# Patient Record
Sex: FEMALE | Race: WHITE | Employment: OTHER | URBAN - METROPOLITAN AREA
[De-identification: names, ages, dates, MRNs, and addresses within clinical notes are randomized per-mention and may not be internally consistent; named-entity substitution may affect disease eponyms.]

---

## 2021-08-06 ENCOUNTER — HOSPITAL ENCOUNTER (INPATIENT)
Age: 71
LOS: 5 days | Discharge: HOME HEALTH | DRG: 917 | End: 2021-08-11
Attending: EMERGENCY MEDICINE | Admitting: INTERNAL MEDICINE
Payer: MEDICARE

## 2021-08-06 DIAGNOSIS — G30.0 EARLY ONSET ALZHEIMER'S DEMENTIA WITH BEHAVIORAL DISTURBANCE (HCC): ICD-10-CM

## 2021-08-06 DIAGNOSIS — I95.2 HYPOTENSION DUE TO DRUGS: ICD-10-CM

## 2021-08-06 DIAGNOSIS — R09.02 HYPOXIA: ICD-10-CM

## 2021-08-06 DIAGNOSIS — D64.9 ANEMIA, UNSPECIFIED TYPE: ICD-10-CM

## 2021-08-06 DIAGNOSIS — T50.901D ACCIDENTAL DRUG OVERDOSE, SUBSEQUENT ENCOUNTER: ICD-10-CM

## 2021-08-06 DIAGNOSIS — F02.818 EARLY ONSET ALZHEIMER'S DEMENTIA WITH BEHAVIORAL DISTURBANCE (HCC): ICD-10-CM

## 2021-08-06 DIAGNOSIS — T50.901A ACCIDENTAL OVERDOSE, INITIAL ENCOUNTER: Primary | ICD-10-CM

## 2021-08-06 LAB
ALBUMIN SERPL-MCNC: 3.2 G/DL (ref 3.2–4.6)
ALBUMIN/GLOB SERPL: 0.8 {RATIO} (ref 1.2–3.5)
ALP SERPL-CCNC: 90 U/L (ref 50–136)
ALT SERPL-CCNC: 17 U/L (ref 12–65)
AMPHET UR QL SCN: NEGATIVE
ANION GAP SERPL CALC-SCNC: 3 MMOL/L (ref 7–16)
APAP SERPL-MCNC: <10 UG/ML (ref 10–30)
APPEARANCE UR: CLEAR
APTT PPP: 26.7 SEC (ref 24.1–35.1)
AST SERPL-CCNC: 9 U/L (ref 15–37)
ATRIAL RATE: 128 BPM
BACTERIA URNS QL MICRO: ABNORMAL /HPF
BARBITURATES UR QL SCN: NEGATIVE
BASOPHILS # BLD: 0 K/UL (ref 0–0.2)
BASOPHILS NFR BLD: 0 % (ref 0–2)
BENZODIAZ UR QL: POSITIVE
BILIRUB SERPL-MCNC: 0.4 MG/DL (ref 0.2–1.1)
BILIRUB UR QL: NEGATIVE
BUN SERPL-MCNC: 15 MG/DL (ref 8–23)
CALCIUM SERPL-MCNC: 9.2 MG/DL (ref 8.3–10.4)
CALCULATED P AXIS, ECG09: 91 DEGREES
CALCULATED R AXIS, ECG10: -50 DEGREES
CALCULATED T AXIS, ECG11: 82 DEGREES
CANNABINOIDS UR QL SCN: NEGATIVE
CASTS URNS QL MICRO: ABNORMAL /LPF
CHLORIDE SERPL-SCNC: 107 MMOL/L (ref 98–107)
CO2 SERPL-SCNC: 32 MMOL/L (ref 21–32)
COCAINE UR QL SCN: NEGATIVE
COLOR UR: YELLOW
CREAT SERPL-MCNC: 0.71 MG/DL (ref 0.6–1)
DIAGNOSIS, 93000: NORMAL
DIFFERENTIAL METHOD BLD: ABNORMAL
EOSINOPHIL # BLD: 0.1 K/UL (ref 0–0.8)
EOSINOPHIL NFR BLD: 1 % (ref 0.5–7.8)
EPI CELLS #/AREA URNS HPF: ABNORMAL /HPF
ERYTHROCYTE [DISTWIDTH] IN BLOOD BY AUTOMATED COUNT: 13.5 % (ref 11.9–14.6)
ETHANOL SERPL-MCNC: <3 MG/DL
GLOBULIN SER CALC-MCNC: 3.8 G/DL (ref 2.3–3.5)
GLUCOSE SERPL-MCNC: 122 MG/DL (ref 65–100)
GLUCOSE UR STRIP.AUTO-MCNC: NEGATIVE MG/DL
HCT VFR BLD AUTO: 37.4 % (ref 35.8–46.3)
HGB BLD-MCNC: 11.9 G/DL (ref 11.7–15.4)
HGB UR QL STRIP: ABNORMAL
IMM GRANULOCYTES # BLD AUTO: 0 K/UL (ref 0–0.5)
IMM GRANULOCYTES NFR BLD AUTO: 0 % (ref 0–5)
INR PPP: 1.2
KETONES UR QL STRIP.AUTO: NEGATIVE MG/DL
LEUKOCYTE ESTERASE UR QL STRIP.AUTO: ABNORMAL
LYMPHOCYTES # BLD: 2.9 K/UL (ref 0.5–4.6)
LYMPHOCYTES NFR BLD: 28 % (ref 13–44)
MAGNESIUM SERPL-MCNC: 1.9 MG/DL (ref 1.8–2.4)
MCH RBC QN AUTO: 28.3 PG (ref 26.1–32.9)
MCHC RBC AUTO-ENTMCNC: 31.8 G/DL (ref 31.4–35)
MCV RBC AUTO: 88.8 FL (ref 79.6–97.8)
METHADONE UR QL: NEGATIVE
MONOCYTES # BLD: 0.7 K/UL (ref 0.1–1.3)
MONOCYTES NFR BLD: 7 % (ref 4–12)
NEUTS SEG # BLD: 6.4 K/UL (ref 1.7–8.2)
NEUTS SEG NFR BLD: 63 % (ref 43–78)
NITRITE UR QL STRIP.AUTO: POSITIVE
NRBC # BLD: 0 K/UL (ref 0–0.2)
OPIATES UR QL: NEGATIVE
P-R INTERVAL, ECG05: 130 MS
PCP UR QL: NEGATIVE
PH UR STRIP: 6 [PH] (ref 5–9)
PLATELET # BLD AUTO: 399 K/UL (ref 150–450)
PMV BLD AUTO: 8.4 FL (ref 9.4–12.3)
POTASSIUM SERPL-SCNC: 3.4 MMOL/L (ref 3.5–5.1)
PROT SERPL-MCNC: 7 G/DL (ref 6.3–8.2)
PROT UR STRIP-MCNC: NEGATIVE MG/DL
PROTHROMBIN TIME: 15.3 SEC (ref 12.5–14.7)
Q-T INTERVAL, ECG07: 300 MS
QRS DURATION, ECG06: 68 MS
QTC CALCULATION (BEZET), ECG08: 438 MS
RBC # BLD AUTO: 4.21 M/UL (ref 4.05–5.2)
RBC #/AREA URNS HPF: ABNORMAL /HPF
SALICYLATES SERPL-MCNC: <1.7 MG/DL (ref 2.8–20)
SODIUM SERPL-SCNC: 142 MMOL/L (ref 136–145)
SP GR UR REFRACTOMETRY: 1.01 (ref 1–1.02)
UROBILINOGEN UR QL STRIP.AUTO: 0.2 EU/DL (ref 0.2–1)
VENTRICULAR RATE, ECG03: 128 BPM
WBC # BLD AUTO: 10.3 K/UL (ref 4.3–11.1)
WBC URNS QL MICRO: ABNORMAL /HPF

## 2021-08-06 PROCEDURE — 85025 COMPLETE CBC W/AUTO DIFF WBC: CPT

## 2021-08-06 PROCEDURE — 81001 URINALYSIS AUTO W/SCOPE: CPT

## 2021-08-06 PROCEDURE — 85610 PROTHROMBIN TIME: CPT

## 2021-08-06 PROCEDURE — 96360 HYDRATION IV INFUSION INIT: CPT

## 2021-08-06 PROCEDURE — 74011000258 HC RX REV CODE- 258: Performed by: EMERGENCY MEDICINE

## 2021-08-06 PROCEDURE — 81003 URINALYSIS AUTO W/O SCOPE: CPT

## 2021-08-06 PROCEDURE — 83735 ASSAY OF MAGNESIUM: CPT

## 2021-08-06 PROCEDURE — 99285 EMERGENCY DEPT VISIT HI MDM: CPT

## 2021-08-06 PROCEDURE — 74011250636 HC RX REV CODE- 250/636: Performed by: INTERNAL MEDICINE

## 2021-08-06 PROCEDURE — 80307 DRUG TEST PRSMV CHEM ANLYZR: CPT

## 2021-08-06 PROCEDURE — 74011000250 HC RX REV CODE- 250: Performed by: EMERGENCY MEDICINE

## 2021-08-06 PROCEDURE — 85730 THROMBOPLASTIN TIME PARTIAL: CPT

## 2021-08-06 PROCEDURE — 80143 DRUG ASSAY ACETAMINOPHEN: CPT

## 2021-08-06 PROCEDURE — 51702 INSERT TEMP BLADDER CATH: CPT

## 2021-08-06 PROCEDURE — 80179 DRUG ASSAY SALICYLATE: CPT

## 2021-08-06 PROCEDURE — 96374 THER/PROPH/DIAG INJ IV PUSH: CPT

## 2021-08-06 PROCEDURE — 06HY33Z INSERTION OF INFUSION DEVICE INTO LOWER VEIN, PERCUTANEOUS APPROACH: ICD-10-PCS | Performed by: EMERGENCY MEDICINE

## 2021-08-06 PROCEDURE — 93005 ELECTROCARDIOGRAM TRACING: CPT | Performed by: EMERGENCY MEDICINE

## 2021-08-06 PROCEDURE — 82077 ASSAY SPEC XCP UR&BREATH IA: CPT

## 2021-08-06 PROCEDURE — 99223 1ST HOSP IP/OBS HIGH 75: CPT | Performed by: INTERNAL MEDICINE

## 2021-08-06 PROCEDURE — 80053 COMPREHEN METABOLIC PANEL: CPT

## 2021-08-06 PROCEDURE — 75810000455 HC PLCMT CENT VENOUS CATH LVL 2 5182

## 2021-08-06 PROCEDURE — 74011250636 HC RX REV CODE- 250/636: Performed by: EMERGENCY MEDICINE

## 2021-08-06 PROCEDURE — 65610000001 HC ROOM ICU GENERAL

## 2021-08-06 RX ORDER — NOREPINEPHRINE BITARTRATE/D5W 8 MG/250ML
0-16 PLASTIC BAG, INJECTION (ML) INTRAVENOUS
Status: DISCONTINUED | OUTPATIENT
Start: 2021-08-06 | End: 2021-08-06 | Stop reason: SDUPTHER

## 2021-08-06 RX ORDER — ONDANSETRON 2 MG/ML
4 INJECTION INTRAMUSCULAR; INTRAVENOUS
Status: DISCONTINUED | OUTPATIENT
Start: 2021-08-06 | End: 2021-08-11 | Stop reason: HOSPADM

## 2021-08-06 RX ORDER — ACETAMINOPHEN 325 MG/1
650 TABLET ORAL
Status: DISCONTINUED | OUTPATIENT
Start: 2021-08-06 | End: 2021-08-11 | Stop reason: HOSPADM

## 2021-08-06 RX ORDER — POLYETHYLENE GLYCOL 3350 17 G/17G
17 POWDER, FOR SOLUTION ORAL DAILY PRN
Status: DISCONTINUED | OUTPATIENT
Start: 2021-08-06 | End: 2021-08-11 | Stop reason: HOSPADM

## 2021-08-06 RX ORDER — SODIUM CHLORIDE 0.9 % (FLUSH) 0.9 %
5-40 SYRINGE (ML) INJECTION AS NEEDED
Status: DISCONTINUED | OUTPATIENT
Start: 2021-08-06 | End: 2021-08-11 | Stop reason: HOSPADM

## 2021-08-06 RX ORDER — ONDANSETRON 4 MG/1
4 TABLET, ORALLY DISINTEGRATING ORAL
Status: DISCONTINUED | OUTPATIENT
Start: 2021-08-06 | End: 2021-08-11 | Stop reason: HOSPADM

## 2021-08-06 RX ORDER — SODIUM CHLORIDE 0.9 % (FLUSH) 0.9 %
5-40 SYRINGE (ML) INJECTION EVERY 8 HOURS
Status: DISCONTINUED | OUTPATIENT
Start: 2021-08-06 | End: 2021-08-11 | Stop reason: HOSPADM

## 2021-08-06 RX ORDER — NOREPINEPHRINE BITARTRATE/D5W 4MG/250ML
.5-3 PLASTIC BAG, INJECTION (ML) INTRAVENOUS
Status: DISCONTINUED | OUTPATIENT
Start: 2021-08-06 | End: 2021-08-07

## 2021-08-06 RX ORDER — ACETAMINOPHEN 650 MG/1
650 SUPPOSITORY RECTAL
Status: DISCONTINUED | OUTPATIENT
Start: 2021-08-06 | End: 2021-08-11 | Stop reason: HOSPADM

## 2021-08-06 RX ORDER — SODIUM CHLORIDE 9 MG/ML
125 INJECTION, SOLUTION INTRAVENOUS CONTINUOUS
Status: DISCONTINUED | OUTPATIENT
Start: 2021-08-06 | End: 2021-08-07

## 2021-08-06 RX ADMIN — NOREPINEPHRINE BITARTRATE 4 MCG/MIN: 1 INJECTION, SOLUTION, CONCENTRATE INTRAVENOUS at 16:15

## 2021-08-06 RX ADMIN — Medication 10 ML: at 18:34

## 2021-08-06 RX ADMIN — SODIUM CHLORIDE 125 ML/HR: 900 INJECTION, SOLUTION INTRAVENOUS at 17:55

## 2021-08-06 RX ADMIN — SODIUM CHLORIDE 1000 ML: 900 INJECTION, SOLUTION INTRAVENOUS at 14:32

## 2021-08-06 RX ADMIN — SODIUM CHLORIDE 1000 ML: 900 INJECTION, SOLUTION INTRAVENOUS at 14:50

## 2021-08-06 RX ADMIN — SODIUM CHLORIDE 1000 ML: 900 INJECTION, SOLUTION INTRAVENOUS at 16:06

## 2021-08-06 NOTE — ED PROVIDER NOTES
Aleshia Hoewll      Candace Jose is a 78 y.o. female seen on 8/6/2021 in the [facility] EMERGENCY DEPT in room ER07/07. History, review of systems and physical exam limited secondary to patient's significant dementia. Chief Complaint   Patient presents with    Drug Overdose     HPI: 30-year-old female with history of early onset advanced dementia presented to the emergency department via POV after accidentally ingesting a large amount of patient's  medications. Approximately 2 hours ago patient took 40-60 100mg Cilostazol. This is patient's  medication for intermittent claudication. Patient family walked in on patient taking the pills and try to get them out of her mouth however she threw them up and swallowed them all. Patient presented to the emergency department with significant hypotension at 50/30 and significantly tachycardic. Patient with significant dementia and is unable to answer why she took the medications. She  does not remember taking the medications. She states that she would like to go home. She has no other complaints at this time. Historian:     REVIEW OF SYSTEMS     Review of Systems   Unable to perform ROS: Dementia   All other systems reviewed and are negative.       PAST MEDICAL HISTORY     Past Medical History:   Diagnosis Date    Anxiety     Depression      Past Surgical History:   Procedure Laterality Date    HX APPENDECTOMY       Social History     Socioeconomic History    Marital status:      Spouse name: Not on file    Number of children: Not on file    Years of education: Not on file    Highest education level: Not on file   Tobacco Use    Smoking status: Current Every Day Smoker     Packs/day: 1.00     Years: 45.00     Pack years: 45.00     Types: Cigarettes    Smokeless tobacco: Never Used     Social Determinants of Health     Financial Resource Strain:     Difficulty of Paying Living Expenses:    Food Insecurity:     Worried About Running Out of Food in the Last Year:     [address] in the Last Year:    Transportation Needs:     Lack of Transportation (Medical):  Lack of Transportation (Non-Medical):    Physical Activity:     Days of Exercise per Week:     Minutes of Exercise per Session:    Stress:     Feeling of Stress :    Social Connections:     Frequency of Communication with Friends and Family:     Frequency of Social Gatherings with Friends and Family:     Attends Gnosticism Services:     Active Member of Clubs or Organizations:     Attends Club or Organization Meetings:     Marital Status:      Cannot display prior to admission medications because the patient has not been admitted in this contact. No Known Allergies     PHYSICAL EXAM       Vitals:    08/06/21 1514 08/06/21 1558 08/06/21 1609 08/06/21 1614   BP: (!) 88/58 (!) 86/64 (!) 90/59 (!) 81/47   Pulse:   (!) 129 (!) 124   Resp:    28   Temp:       SpO2:   91% 92%    Vital signs were reviewed. Physical Exam  Vitals and nursing note reviewed. Constitutional:       General: She is not in acute distress. Appearance: Normal appearance. She is not ill-appearing or toxic-appearing. HENT:      Head: Normocephalic and atraumatic. Mouth/Throat:      Mouth: Mucous membranes are dry. Eyes:      Extraocular Movements: Extraocular movements intact. Pupils: Pupils are equal, round, and reactive to light. Cardiovascular:      Rate and Rhythm: Regular rhythm. Tachycardia present. Pulses: Normal pulses. Heart sounds: Normal heart sounds. Pulmonary:      Effort: Pulmonary effort is normal.      Breath sounds: Normal breath sounds. Abdominal:      General: There is no distension. Palpations: Abdomen is soft. Musculoskeletal:         General: Normal range of motion. Cervical back: Normal range of motion. Skin:     General: Skin is warm and dry.    Neurological:      Mental Status: She is alert. Mental status is at baseline. She is disoriented. Comments: To person only   Psychiatric:      Comments: Normal affect. Significant dementia. MEDICAL DECISION MAKING     ED Course:    Orders Placed This Encounter    CENTRAL VENOUS LINE INSERTION    CBC WITH AUTOMATED DIFF    CMP    SALICYLATE    ACETAMINOPHEN    ALCOHOL    MAGNESIUM    URINE DRUG SCREEN    URINALYSIS    NURSING-MISCELLANEOUS: Complete - [place] - Suicide Severity Rating Scale CONTINUOUS    NURSING-MISCELLANEOUS: Complete - SBIRT: Screening, Brief Intervention, and Referral to Treatment for Substance Use. CONTINUOUS    POC URINE DIPSTICK    CRITICAL CARE (ASAP ONLY)    EKG    sodium chloride 0.9 % bolus infusion 1,000 mL    sodium chloride 0.9 % bolus infusion 1,000 mL    sodium chloride 0.9 % bolus infusion 1,000 mL    DISCONTD: NOREPINephrine (LEVOPHED) 8,000 mcg in dextrose 5% 250 mL infusion    DISCONTD: NOREPINephrine (LEVOPHED) 4,000 mcg in dextrose 5% 250 mL infusion    NOREPINephrine (LEVOPHED) 4 mg in 5% dextrose 250 mL infusion     Recent Results (from the past 8 hour(s))   EKG, 12 LEAD, INITIAL    Collection Time: 08/06/21  2:28 PM   Result Value Ref Range    Ventricular Rate 128 BPM    Atrial Rate 128 BPM    P-R Interval 130 ms    QRS Duration 68 ms    Q-T Interval 300 ms    QTC Calculation (Bezet) 438 ms    Calculated P Axis 91 degrees    Calculated R Axis -50 degrees    Calculated T Axis 82 degrees    Diagnosis       !! AGE AND GENDER SPECIFIC ECG ANALYSIS !!   Sinus tachycardia  Right atrial enlargement  Pulmonary disease pattern  Left anterior fascicular block  Abnormal ECG  No previous ECGs available     CBC WITH AUTOMATED DIFF    Collection Time: 08/06/21  2:31 PM   Result Value Ref Range    WBC 10.3 4.3 - 11.1 K/uL    RBC 4.21 4.05 - 5.2 M/uL    HGB 11.9 11.7 - 15.4 g/dL    HCT 37.4 35.8 - 46.3 %    MCV 88.8 79.6 - 97.8 FL    MCH 28.3 26.1 - 32.9 PG    MCHC 31.8 31.4 - 35.0 g/dL    RDW 13.5 11.9 - 14.6 %    PLATELET 814 693 - 805 K/uL    MPV 8.4 (L) 9.4 - 12.3 FL    ABSOLUTE NRBC 0.00 0.0 - 0.2 K/uL    DF AUTOMATED      NEUTROPHILS 63 43 - 78 %    LYMPHOCYTES 28 13 - 44 %    MONOCYTES 7 4.0 - 12.0 %    EOSINOPHILS 1 0.5 - 7.8 %    BASOPHILS 0 0.0 - 2.0 %    IMMATURE GRANULOCYTES 0 0.0 - 5.0 %    ABS. NEUTROPHILS 6.4 1.7 - 8.2 K/UL    ABS. LYMPHOCYTES 2.9 0.5 - 4.6 K/UL    ABS. MONOCYTES 0.7 0.1 - 1.3 K/UL    ABS. EOSINOPHILS 0.1 0.0 - 0.8 K/UL    ABS. BASOPHILS 0.0 0.0 - 0.2 K/UL    ABS. IMM. GRANS. 0.0 0.0 - 0.5 K/UL   METABOLIC PANEL, COMPREHENSIVE    Collection Time: 08/06/21  2:31 PM   Result Value Ref Range    Sodium 142 136 - 145 mmol/L    Potassium 3.4 (L) 3.5 - 5.1 mmol/L    Chloride 107 98 - 107 mmol/L    CO2 32 21 - 32 mmol/L    Anion gap 3 (L) 7 - 16 mmol/L    Glucose 122 (H) 65 - 100 mg/dL    BUN 15 8 - 23 MG/DL    Creatinine 0.71 0.6 - 1.0 MG/DL    GFR est AA >60 >60 ml/min/1.73m2    GFR est non-AA >60 >60 ml/min/1.73m2    Calcium 9.2 8.3 - 10.4 MG/DL    Bilirubin, total 0.4 0.2 - 1.1 MG/DL    ALT (SGPT) 17 12 - 65 U/L    AST (SGOT) 9 (L) 15 - 37 U/L    Alk. phosphatase 90 50 - 136 U/L    Protein, total 7.0 6.3 - 8.2 g/dL    Albumin 3.2 3.2 - 4.6 g/dL    Globulin 3.8 (H) 2.3 - 3.5 g/dL    A-G Ratio 0.8 (L) 1.2 - 3.5     SALICYLATE    Collection Time: 08/06/21  2:31 PM   Result Value Ref Range    Salicylate level <1.0 (L) 2.8 - 20.0 MG/DL   ETHYL ALCOHOL    Collection Time: 08/06/21  2:31 PM   Result Value Ref Range    ALCOHOL(ETHYL),SERUM <3 MG/DL   MAGNESIUM    Collection Time: 08/06/21  2:31 PM   Result Value Ref Range    Magnesium 1.9 1.8 - 2.4 mg/dL     No results found. EKG interpretation #1: Rate 133. Sinus tachycardia with normal MA and QT intervals. ED Course as of Aug 06 1623   Fri Aug 06, 2021   1446 Discussed with poison control. There is no definitive treatment for this patient's overdose.   Per poison control's literature search, the other known high dose overdose on this medication resulted in refractory hypotension despite pressors, cardiac dysrhythmias and ultimately ended up in patient's death 13 hours after ingestion. Blood control recommended aggressive fluid hydration and pressors as needed. No other interventions indicated at this time. [JL]   4092 Explained at length to patient's  was agreeable this plan understands the critical nature of patient's condition. He consented for central line placement. [JL]   [phone] Discussed with intensivist.  Patient will be admitted to the intensivist.    [JL]      ED Course User Index  [JL] Eber Yepez, DO         MDM  Number of Diagnoses or Management Options  Accidental overdose, initial encounter  Hypotension due to drugs  Diagnosis management comments: 82-year-old female presented to the emergency department after an overdose on her 's medications. Discussed with poison control who recommended symptomatic care however there is no reversal agent or definitive treatment for patient's overdose. Per poison control, there is one reported case of similar overdose that resulted in refractory hypotension, cardiac dysrhythmia and ultimately expiration of patient 13 hours after ingestion. Patient intermittently responsive to fluids in the emergency department. Central line was placed. Discussed with intensivist as well and patient will be admitted to the intensive care unit. Levophed ordered as well with continued fluid hydration. I discussed at length with patient's  who is aware critical nature of patient's condition. Patient will be admitted to the intensivist for further treatment evaluation.        Amount and/or Complexity of Data Reviewed  Clinical lab tests: ordered  Decide to obtain previous medical records or to obtain history from someone other than the patient: yes  Obtain history from someone other than the patient: yes  Review and summarize past medical records: yes  Discuss the patient with other providers: yes    Patient Progress  Patient progress: (Critical)    Central Line    Date/Time: 8/6/2021 4:21 PM  Performed by: Betzy Roger DO  Authorized by: Betzy Roger DO     Consent:     Consent obtained:  Written    Consent given by:  Spouse    Risks discussed:  Bleeding and infection  Pre-procedure details:     Hand hygiene: Hand hygiene performed prior to insertion      Skin preparation:  2% chlorhexidine    Skin preparation agent: Skin preparation agent completely dried prior to procedure    Procedure details:     Location:  L femoral    Patient position:  Trendelenburg    Procedural supplies: Quad lumen. Landmarks identified: yes      Ultrasound guidance: yes      Sterile ultrasound techniques: Sterile gel and sterile probe covers were used      Number of attempts:  1    Successful placement: yes    Post-procedure details:     Post-procedure:  Dressing applied and line sutured    Assessment:  Blood return through all ports and free fluid flow    Patient tolerance of procedure:   Tolerated well, no immediate complications  Critical Care  Performed by: Betzy Roger DO  Authorized by: Betzy Roger DO     Critical care provider statement:     Critical care time (minutes):  120    Critical care was necessary to treat or prevent imminent or life-threatening deterioration of the following conditions:  Shock and toxidrome    Critical care was time spent personally by me on the following activities:  Blood draw for specimens, development of treatment plan with patient or surrogate, discussions with consultants, evaluation of patient's response to treatment, examination of patient, obtaining history from patient or surrogate, ordering and performing treatments and interventions, ordering and review of laboratory studies, pulse oximetry and re-evaluation of patient's condition  Comments:      Potentially lethal overdose requiring multiple conversations with poison control and intensivist.  Aggressive fluid hydration with pressor support. Disposition: Admitted   Diagnosis:     ICD-10-CM ICD-9-CM   1. Accidental overdose, initial encounter  T50.901A 977.9     E858.9   2. Hypotension due to drugs  I95.2 458.8     E947.9     ____________________________________________________________________  A portion of this note was generated using voice recognition dictation software. While the note has been reviewed for accuracy, please note certain words and phrases may not be transcribed as intended and some grammatical and/or typographical errors may be present.

## 2021-08-06 NOTE — PROGRESS NOTES
Bedside and Verbal shift change report given to Marcus RN (oncoming nurse) by Marcello Ray RN (offgoing nurse). Report included the following information SBAR, Kardex and ED Summary.

## 2021-08-06 NOTE — PROGRESS NOTES
Patient received to ICU. Patient connected to bedside monitor. Sinus tach 130s on monitor. BP stable on 4 mcg/min of levophed. Decreased to 2 mcg/min. Other VSS. Patient oriented to self only. This is baseline per . Soft restraints and mitts in place for patient safety to prevent pulling of CVC. Skin intact. Will continue to monitor until shift change.

## 2021-08-06 NOTE — ED NOTES
TRANSFER - OUT REPORT:    Verbal report given to [facility] RN(name) on Darren Hart  being transferred to ICU(unit) for routine progression of care       Report consisted of patients Situation, Background, Assessment and   Recommendations(SBAR). Information from the following report(s) SBAR was reviewed with the receiving nurse. Lines:   Quad Lumen 08/06/21 Left;Proximal Femoral (Active)       Peripheral IV 08/06/21 Right Antecubital (Active)       Peripheral IV 08/06/21 Left Forearm (Active)   Site Assessment Clean, dry, & intact 08/06/21 1443   Phlebitis Assessment 0 08/06/21 1443   Infiltration Assessment 0 08/06/21 1443   Dressing Status Clean, dry, & intact 08/06/21 1443   Hub Color/Line Status Pink 08/06/21 1443        Opportunity for questions and clarification was provided.       Patient transported with:   Monitor  Registered Nurse

## 2021-08-06 NOTE — PROGRESS NOTES
TRANSFER - IN REPORT:    Verbal report received from Marissa(name) on Ruddy Maher  being received from ED(unit) for routine progression of care      Report consisted of patients Situation, Background, Assessment and   Recommendations(SBAR). Information from the following report(s) SBAR, ED Summary, Intake/Output, MAR, Recent Results and Cardiac Rhythm S. Tach was reviewed with the receiving nurse. Opportunity for questions and clarification was provided. Assessment completed upon patients arrival to unit and care assumed.

## 2021-08-06 NOTE — H&P
History and Physical/Consult  Marcella Schroeder    8/6/2021    Date of Admission:  8/6/2021      Subjective:     Patient is a 78 y.o.  female presents with cilostazol OD. A pleasantly demented, minimally functional and fully dependent female presents after having been discovered to have swallowed around 60 tablets of cilostazol. Brought to ER and she is being admitted for observation to ICU. Cilostazol OD is a rare occurrence and associated with vasodilation induced hypotension cardiac arrhythmias and bleeding. There is no antidote and it is non dialyzable and care is supportive. Patient had no suicidal intention, she simply did not know what she was doing due to her advanced dementia and was left alone as her  says for around 5 min. She is stable, cannot follow command and is awake and alert at present with some hypotention requiring levophed. A CVC was placed in her left femoral vein with difficulty due to patient lack of cooperation from her dementia. Prior to Admission Medications   Prescriptions Last Dose Informant Patient Reported? Taking?   escitalopram oxalate (LEXAPRO) 10 mg tablet   Yes No   Sig: Take 10 mg by mouth daily. umeclidinium-vilanterol (ANORO ELLIPTA) 62.5-25 mcg/actuation inhaler   No No   Sig: Take 1 Puff by inhalation daily.       Facility-Administered Medications: None       Review of Systems  Unable to obtain due to dementia          Past Medical History:   Diagnosis Date    Anxiety     Depression      Past Surgical History:   Procedure Laterality Date    HX APPENDECTOMY       Social History     Socioeconomic History    Marital status:      Spouse name: Not on file    Number of children: Not on file    Years of education: Not on file    Highest education level: Not on file   Occupational History    Not on file   Tobacco Use    Smoking status: Current Every Day Smoker     Packs/day: 1.00     Years: 45.00     Pack years: 45.00     Types: Cigarettes    Smokeless tobacco: Never Used   Substance and Sexual Activity    Alcohol use: Not on file    Drug use: Not on file    Sexual activity: Not on file   Other Topics Concern    Not on file   Social History Narrative    Not on file     Social Determinants of Health     Financial Resource Strain:     Difficulty of Paying Living Expenses:    Food Insecurity:     Worried About Running Out of Food in the Last Year:     [address] in the Last Year:    Transportation Needs:     Lack of Transportation (Medical):  Lack of Transportation (Non-Medical):    Physical Activity:     Days of Exercise per Week:     Minutes of Exercise per Session:    Stress:     Feeling of Stress :    Social Connections:     Frequency of Communication with Friends and Family:     Frequency of Social Gatherings with Friends and Family:     Attends Gnosticist Services:     Active Member of Clubs or Organizations:     Attends Club or Organization Meetings:     Marital Status:    Intimate Partner Violence:     Fear of Current or Ex-Partner:     Emotionally Abused:     Physically Abused:     Sexually Abused:      Family History   Problem Relation Age of Onset    Breast Cancer Neg Hx      No Known Allergies    Current Facility-Administered Medications   Medication Dose Route Frequency    NOREPINephrine (LEVOPHED) 4 mg in 5% dextrose 250 mL infusion  0.5-30 mcg/min IntraVENous TITRATE     Current Outpatient Medications   Medication Sig    escitalopram oxalate (LEXAPRO) 10 mg tablet Take 10 mg by mouth daily.  umeclidinium-vilanterol (ANORO ELLIPTA) 62.5-25 mcg/actuation inhaler Take 1 Puff by inhalation daily.        Objective:     Vitals:    08/06/21 1558 08/06/21 1609 08/06/21 1614 08/06/21 1626   BP: (!) 86/64 (!) 90/59 (!) 81/47 112/63   Pulse:  (!) 129 (!) 124 (!) 121   Resp:   28    Temp:       SpO2:  91% 92% 92%     PHYSICAL EXAM     Gen- the patient is well developed and in no acute distress  HEENT- PERRL, EOMI, no scleral icterus       nose without alar flaring or epistaxis                  oral [place] moist without cyanosis  Neck- no JVD or retractions  Lungs- CTA  Heart- RRR without M,G,R  Abd- soft and non-tender; with positive bowel sounds. Ext- warm without cyanosis. There is no lower leg edema. Skin- no jaundice or rashes, no wounds   Neuro- alert and oriented x 3. No gross sensorimotor deficits are present. Non verbal with advanced dementia    Recent Labs     08/06/21  1431   WBC 10.3   HGB 11.9   HCT 37.4        Recent Labs     08/06/21  1431      K 3.4*      *   CO2 32   BUN 15   CREA 0.71   MG 1.9     No results for input(s): PH, PCO2, PO2, HCO3 in the last 72 hours. 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 Unfortunately there is no antidote for this medication and it is non dialyzable.   Discussed with , she is DNR              Dorothy Fay MD

## 2021-08-06 NOTE — ED TRIAGE NOTES
Patient arrives to ED pov from home. Patient has dementia and  states she took 30 Cilostazol pills at 12:30.

## 2021-08-07 PROBLEM — N39.0 UTI (URINARY TRACT INFECTION): Status: ACTIVE | Noted: 2021-08-07

## 2021-08-07 PROBLEM — R73.9 HYPERGLYCEMIA: Status: ACTIVE | Noted: 2021-08-07

## 2021-08-07 PROBLEM — R09.02 HYPOXIA: Status: ACTIVE | Noted: 2021-08-07

## 2021-08-07 PROBLEM — E87.6 HYPOKALEMIA: Status: ACTIVE | Noted: 2021-08-07

## 2021-08-07 PROBLEM — D62 ACUTE BLOOD LOSS ANEMIA: Status: ACTIVE | Noted: 2021-08-07

## 2021-08-07 PROBLEM — D64.9 ANEMIA: Status: ACTIVE | Noted: 2021-08-07

## 2021-08-07 PROBLEM — I95.9 HYPOTENSION: Status: ACTIVE | Noted: 2021-08-07

## 2021-08-07 PROBLEM — R19.7 DIARRHEA: Status: ACTIVE | Noted: 2021-08-07

## 2021-08-07 LAB
ANION GAP SERPL CALC-SCNC: 7 MMOL/L (ref 7–16)
ATRIAL RATE: 127 BPM
BASOPHILS # BLD: 0 K/UL (ref 0–0.2)
BASOPHILS NFR BLD: 0 % (ref 0–2)
BUN SERPL-MCNC: 14 MG/DL (ref 8–23)
C DIFF GDH STL QL: NORMAL
C DIFF TOX A+B STL QL IA: NORMAL
CALCIUM SERPL-MCNC: 8 MG/DL (ref 8.3–10.4)
CALCULATED P AXIS, ECG09: 80 DEGREES
CALCULATED R AXIS, ECG10: 40 DEGREES
CALCULATED T AXIS, ECG11: 89 DEGREES
CHLORIDE SERPL-SCNC: 114 MMOL/L (ref 98–107)
CLINICAL CONSIDERATION: NORMAL
CO2 SERPL-SCNC: 22 MMOL/L (ref 21–32)
CREAT SERPL-MCNC: 0.65 MG/DL (ref 0.6–1)
DIAGNOSIS, 93000: NORMAL
DIFFERENTIAL METHOD BLD: ABNORMAL
EOSINOPHIL # BLD: 0 K/UL (ref 0–0.8)
EOSINOPHIL NFR BLD: 0 % (ref 0.5–7.8)
ERYTHROCYTE [DISTWIDTH] IN BLOOD BY AUTOMATED COUNT: 13.2 % (ref 11.9–14.6)
GLUCOSE SERPL-MCNC: 147 MG/DL (ref 65–100)
HCT VFR BLD AUTO: 26.7 % (ref 35.8–46.3)
HCT VFR BLD AUTO: 27.8 % (ref 35.8–46.3)
HEMOCCULT STL QL: NEGATIVE
HGB BLD-MCNC: 8.8 G/DL (ref 11.7–15.4)
HGB BLD-MCNC: 9.2 G/DL (ref 11.7–15.4)
IMM GRANULOCYTES # BLD AUTO: 0.1 K/UL (ref 0–0.5)
IMM GRANULOCYTES NFR BLD AUTO: 1 % (ref 0–5)
INTERPRETATION: NORMAL
LYMPHOCYTES # BLD: 1.2 K/UL (ref 0.5–4.6)
LYMPHOCYTES NFR BLD: 8 % (ref 13–44)
MCH RBC QN AUTO: 28.8 PG (ref 26.1–32.9)
MCHC RBC AUTO-ENTMCNC: 33 G/DL (ref 31.4–35)
MCV RBC AUTO: 87.3 FL (ref 79.6–97.8)
MONOCYTES # BLD: 0.8 K/UL (ref 0.1–1.3)
MONOCYTES NFR BLD: 5 % (ref 4–12)
NEUTS SEG # BLD: 13.1 K/UL (ref 1.7–8.2)
NEUTS SEG NFR BLD: 86 % (ref 43–78)
NRBC # BLD: 0 K/UL (ref 0–0.2)
P-R INTERVAL, ECG05: 124 MS
PCR REFLEX: NORMAL
PLATELET # BLD AUTO: 303 K/UL (ref 150–450)
PMV BLD AUTO: 8.2 FL (ref 9.4–12.3)
POTASSIUM SERPL-SCNC: 3.4 MMOL/L (ref 3.5–5.1)
Q-T INTERVAL, ECG07: 270 MS
QRS DURATION, ECG06: 70 MS
QTC CALCULATION (BEZET), ECG08: 392 MS
RBC # BLD AUTO: 3.06 M/UL (ref 4.05–5.2)
SODIUM SERPL-SCNC: 143 MMOL/L (ref 136–145)
VENTRICULAR RATE, ECG03: 127 BPM
WBC # BLD AUTO: 15.2 K/UL (ref 4.3–11.1)

## 2021-08-07 PROCEDURE — 82272 OCCULT BLD FECES 1-3 TESTS: CPT

## 2021-08-07 PROCEDURE — 2709999900 HC NON-CHARGEABLE SUPPLY

## 2021-08-07 PROCEDURE — 74011250636 HC RX REV CODE- 250/636: Performed by: INTERNAL MEDICINE

## 2021-08-07 PROCEDURE — 36415 COLL VENOUS BLD VENIPUNCTURE: CPT

## 2021-08-07 PROCEDURE — 85018 HEMOGLOBIN: CPT

## 2021-08-07 PROCEDURE — 74011250637 HC RX REV CODE- 250/637: Performed by: INTERNAL MEDICINE

## 2021-08-07 PROCEDURE — 65270000029 HC RM PRIVATE

## 2021-08-07 PROCEDURE — 74011250637 HC RX REV CODE- 250/637: Performed by: FAMILY MEDICINE

## 2021-08-07 PROCEDURE — 77030032995 HC TRNSDUC BLD DBL VAMP KT EDWD -B

## 2021-08-07 PROCEDURE — 99233 SBSQ HOSP IP/OBS HIGH 50: CPT | Performed by: INTERNAL MEDICINE

## 2021-08-07 PROCEDURE — 87324 CLOSTRIDIUM AG IA: CPT

## 2021-08-07 PROCEDURE — 80048 BASIC METABOLIC PNL TOTAL CA: CPT

## 2021-08-07 PROCEDURE — 74011000258 HC RX REV CODE- 258: Performed by: INTERNAL MEDICINE

## 2021-08-07 PROCEDURE — 93005 ELECTROCARDIOGRAM TRACING: CPT | Performed by: INTERNAL MEDICINE

## 2021-08-07 PROCEDURE — 85025 COMPLETE CBC W/AUTO DIFF WBC: CPT

## 2021-08-07 PROCEDURE — 74011000250 HC RX REV CODE- 250: Performed by: INTERNAL MEDICINE

## 2021-08-07 RX ORDER — CHOLECALCIFEROL (VITAMIN D3) 125 MCG
5 CAPSULE ORAL
Status: DISCONTINUED | OUTPATIENT
Start: 2021-08-07 | End: 2021-08-11 | Stop reason: HOSPADM

## 2021-08-07 RX ORDER — SAME BUTANEDISULFONATE/BETAINE 400-600 MG
500 POWDER IN PACKET (EA) ORAL 2 TIMES DAILY
Status: DISCONTINUED | OUTPATIENT
Start: 2021-08-07 | End: 2021-08-11 | Stop reason: HOSPADM

## 2021-08-07 RX ORDER — POTASSIUM CHLORIDE 14.9 MG/ML
20 INJECTION INTRAVENOUS
Status: COMPLETED | OUTPATIENT
Start: 2021-08-07 | End: 2021-08-07

## 2021-08-07 RX ORDER — LOPERAMIDE HYDROCHLORIDE 2 MG/1
2 CAPSULE ORAL
Status: DISCONTINUED | OUTPATIENT
Start: 2021-08-07 | End: 2021-08-11 | Stop reason: HOSPADM

## 2021-08-07 RX ORDER — DEXTROSE MONOHYDRATE AND SODIUM CHLORIDE 5; .45 G/100ML; G/100ML
50 INJECTION, SOLUTION INTRAVENOUS CONTINUOUS
Status: DISCONTINUED | OUTPATIENT
Start: 2021-08-07 | End: 2021-08-11 | Stop reason: HOSPADM

## 2021-08-07 RX ORDER — TEMAZEPAM 15 MG/1
15 CAPSULE ORAL
Status: DISCONTINUED | OUTPATIENT
Start: 2021-08-07 | End: 2021-08-11 | Stop reason: HOSPADM

## 2021-08-07 RX ADMIN — POTASSIUM CHLORIDE 20 MEQ: 14.9 INJECTION, SOLUTION INTRAVENOUS at 07:36

## 2021-08-07 RX ADMIN — Medication 10 ML: at 00:31

## 2021-08-07 RX ADMIN — Medication 10 ML: at 21:52

## 2021-08-07 RX ADMIN — Medication 40 ML: at 06:00

## 2021-08-07 RX ADMIN — RDII 250 MG CAPSULE 500 MG: at 17:41

## 2021-08-07 RX ADMIN — DEXTROSE MONOHYDRATE AND SODIUM CHLORIDE 50 ML/HR: 5; .45 INJECTION, SOLUTION INTRAVENOUS at 11:57

## 2021-08-07 RX ADMIN — CEFTRIAXONE 1 G: 1 INJECTION, POWDER, FOR SOLUTION INTRAMUSCULAR; INTRAVENOUS at 17:41

## 2021-08-07 RX ADMIN — Medication 20 ML: at 13:30

## 2021-08-07 RX ADMIN — POTASSIUM CHLORIDE 20 MEQ: 14.9 INJECTION, SOLUTION INTRAVENOUS at 09:18

## 2021-08-07 RX ADMIN — Medication 5 MG: at 21:52

## 2021-08-07 RX ADMIN — SODIUM CHLORIDE 125 ML/HR: 900 INJECTION, SOLUTION INTRAVENOUS at 09:01

## 2021-08-07 RX ADMIN — SODIUM CHLORIDE 125 ML/HR: 900 INJECTION, SOLUTION INTRAVENOUS at 00:30

## 2021-08-07 NOTE — PROGRESS NOTES
Bedside and verbal shift change report received from  Patrick Yang (offgoing nurse). Report included the following information SBAR, ED Summary, Intake/Output and Recent Results.      Dual skin assessment completed at bedside: WNL (list pertinent skin assessment findings)    Dual verification of gtts completed (name of gtts verified): levo 2 mcgs

## 2021-08-07 NOTE — CONSULTS
[facility] Hospitalist Consult   Admit Date:  2021  2:28 PM   Name:  Gertrude Guillory   Age:  78 y.o. Sex:  female  :  1950   MRN:  [id number]     Presenting Complaint: Drug Overdose    Reason(s) for Admission: Overdose [T50.901A]     Hospitalists consulted by Gayatri Nunes MD for: assuming care from [facility] Dr. Myra Sears     History of Presenting Illness:   Gertrude Guillory is a 78 y.o. female with history of dementia who was admitted on 21 to [facility] with unintentional cilostazol overdose. She was left un attended by  for short time and found to have mistakenly taken 60 tablets of cilostazol. She was hypotensive and required ICU care for course of levophed that has been stopped. She did have a decline in her HGB from 11.9 to 8.8 without active notable bleeding and is hemoccult stool negative. She has some loose diarrhea, cdiff negative. UA positive. Her  is at bedside and states on a good day she is some what vocal and ambulatory. She is currently in mitts and has a tremor that he noted since moving from ICU. She has not eaten too much yet. 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 ([facility] [address] (2021)  ·   No chronic meds  · In mitts and calm      3-Anemia (2021)  ·   Stable repeat HGB at 9.2 without ruthann bleeding and negative occult stool         4-(2021)  ·   On room air  · CXR ordered  for tomorrow           5-Hypokalemia (2021)  ·  replace and repeat lab with magnesium           6-(urinary tract infection) (2021)  ·   D1 rocephin   · Add urine culture         7-Diarrhea (2021)  · cdiff negative  · Add imodium and florastor       8-Hyperglycemia:  · On dextrose IVF  · Poor oral intake          Past Medical History:   Diagnosis Date    Anxiety     Depression       Past Surgical History:   Procedure Laterality Date    HX APPENDECTOMY        No Known Allergies   Social History     Tobacco Use    Smoking status: Current Every Day Smoker-- unable to verify     Packs/day: 1.00     Years: 45.00     Pack years: 45.00     Types: Cigarettes    Smokeless tobacco: Never Used   Substance Use Topics    Alcohol use:  Unable to obtain       Family History   Problem Relation Age of Onset    Breast Cancer Neg Hx       Family history reviewed          Immunization History   Administered Date(s) Administered    Influenza Vaccine 11/01/2016    Pneumococcal Polysaccharide (PPSV-23) 11/01/2016    TB Skin Test (PPD) 01/01/1982       Objective:     Patient Vitals for the past 24 hrs:   Temp Pulse Resp BP SpO2   08/07/21 1602 97.9 °F (36.6 °C) (!) 121 -- (!) 144/72 96 %   08/07/21 1444 99 °F (37.2 °C) (!) 124 -- 133/67 96 %   08/07/21 1401 -- (!) 115 26 (!) 147/75 97 %   08/07/21 1346 -- (!) 123 (!) 32 (!) 146/66 95 %   08/07/21 1332 -- (!) 111 26 135/63 96 %   08/07/21 1316 -- (!) 117 27 (!) 153/69 96 %   08/07/21 1301 -- (!) 119 27 (!) 173/65 97 %   08/07/21 1247 -- (!) 129 (!) 35 (!) 178/71 97 %   08/07/21 1157 98.9 °F (37.2 °C) -- -- -- --   08/07/21 1047 -- (!) 116 26 135/65 97 %   08/07/21 1032 -- (!) 121 25 125/69 97 %   08/07/21 1016 -- (!) 118 28 120/61 96 %   08/07/21 1001 -- (!) 116 28 124/68 97 %   08/07/21 0947 -- (!) 118 25 125/63 96 %   08/07/21 0932 -- (!) 133 24 124/66 93 %   08/07/21 0916 -- (!) 113 (!) 33 (!) 116/57 95 %   08/07/21 0901 -- (!) 115 29 (!) 117/58 96 %   08/07/21 0847 -- (!) 112 26 (!) 111/59 96 %   08/07/21 0832 -- (!) 113 22 (!) 113/57 97 %   08/07/21 0816 -- (!) 118 20 (!) 110/54 95 %   08/07/21 0801 -- (!) 125 30 (!) 93/54 96 %   08/07/21 0747 -- (!) 120 28 (!) 104/58 95 % 08/07/21 0733 99.1 °F (37.3 °C) (!) 118 26 (!) 101/55 95 %   08/07/21 0647 -- (!) 121 (!) 33 (!) 84/60 96 %   08/07/21 0631 -- (!) 128 (!) 41 (!) 112/54 95 %   08/07/21 0616 -- (!) 123 (!) 31 (!) 97/53 95 %   08/07/21 0602 -- (!) 124 29 (!) 95/50 95 %   08/07/21 0547 -- (!) 124 28 (!) 119/52 94 %   08/07/21 0532 -- (!) 140 (!) 40 119/74 94 %   08/07/21 0516 -- (!) 126 27 (!) 111/55 96 %   08/07/21 0501 -- (!) 126 (!) 31 (!) 109/53 94 %   08/07/21 0447 -- (!) 125 29 (!) 111/52 95 %   08/07/21 0431 -- (!) 126 28 (!) 113/54 95 %   08/07/21 0416 -- (!) 127 (!) 32 (!) 117/55 97 %   08/07/21 0401 -- (!) 130 25 (!) 117/58 94 %   08/07/21 0347 -- (!) 128 (!) 32 (!) 112/52 95 %   08/07/21 0331 -- (!) 126 25 (!) 106/51 94 %   08/07/21 0316 -- (!) 129 29 (!) 96/55 93 %   08/07/21 0301 98.4 °F (36.9 °C) (!) 124 27 (!) 92/53 95 %   08/07/21 0247 -- (!) 125 28 (!) 99/52 95 %   08/07/21 0231 -- (!) 127 26 (!) 115/57 95 %   08/07/21 0216 -- (!) 127 (!) 33 (!) 114/56 95 %   08/07/21 0201 -- (!) 128 22 (!) 115/58 95 %   08/07/21 0147 -- (!) 124 (!) 32 (!) 98/52 96 %   08/07/21 0131 -- (!) 128 29 (!) 114/57 94 %   08/07/21 0116 -- (!) 132 29 (!) 110/56 95 %   08/07/21 0101 -- (!) 128 (!) 31 (!) 106/52 95 %   08/07/21 0047 -- (!) 142 (!) 36 (!) 96/55 97 %   08/07/21 0031 -- (!) 130 22 (!) 96/53 94 %   08/07/21 0022 -- (!) 135 29 (!) 88/52 94 %   08/07/21 0016 -- (!) 135 27 (!) 118/58 97 %   08/07/21 0001 -- (!) 134 (!) 49 (!) 122/59 94 %   08/06/21 2301 98.1 °F (36.7 °C) (!) 130 29 (!) 102/52 94 %   08/06/21 2247 -- (!) 133 (!) 31 (!) 106/51 94 %   08/06/21 2231 -- (!) 144 29 111/64 93 %   08/06/21 2216 -- (!) 137 (!) 32 (!) 107/58 93 %   08/06/21 2201 -- (!) 144 24 (!) 121/57 93 %   08/06/21 2147 -- (!) 137 (!) 74 117/68 94 %   08/06/21 2131 -- (!) 129 (!) 42 (!) 94/53 94 %   08/06/21 2128 -- (!) 130 30 (!) 101/58 94 %   08/06/21 2005 -- (!) 140 20 (!) 106/55 92 %   08/06/21 1947 -- (!) 138 25 (!) 104/56 92 %   08/06/21 1931 -- Dory [employer]ans 139 27 (!) 96/51 91 %   08/06/21 1918 -- (!) 143 (!) 51 (!) 91/55 91 %   08/06/21 1916 -- (!) 138 29 (!) 97/55 91 %   08/06/21 1901 98.7 °F (37.1 °C) (!) 138 26 (!) 119/56 91 %   08/06/21 1900 98.7 °F (37.1 °C) -- -- -- --   08/06/21 1847 -- (!) 141 (!) 47 119/60 90 %   08/06/21 1755 -- (!) 134 -- (!) 103/55 93 %   08/06/21 1746 -- (!) 125 26 (!) 99/56 91 %   08/06/21 1736 -- (!) 126 23 (!) 97/55 93 %   08/06/21 1726 -- (!) 124 -- (!) 99/55 94 %   08/06/21 1716 -- (!) 126 18 (!) 103/59 94 %   08/06/21 1706 -- (!) 132 -- (!) 107/56 93 %   08/06/21 1655 -- (!) 127 -- 108/61 93 %   08/06/21 1647 -- (!) 130 -- 99/61 92 %   08/06/21 1636 -- (!) 113 -- (!) 95/54 94 %     Oxygen Therapy  O2 Sat (%): 96 % (08/07/21 1602)  Pulse via Oximetry: 113 beats per minute (08/07/21 1401)  O2 Device: None (Room air) (08/07/21 0725)    Estimated body mass index is 19.09 kg/m² as calculated from the following:    Height as of 11/16/16: 5' 2.5\" (1.588 m). Weight as of 11/16/16: 48.1 kg (106 lb 1 oz). Intake/Output Summary (Last 24 hours) at 8/7/2021 1634  Last data filed at 8/7/2021 1250  Gross per 24 hour   Intake --   Output 800 ml   Net -800 ml         Physical Exam:    General:    Well nourished. No overt distress, elderly, not verballly interactive   Head:  Normocephalic, atraumatic  Eyes:  Sclerae appear normal.  Pupils equally round. HENT:  Nares appear normal, no drainage. Moist mucous membranes  Neck:  No restricted ROM. Trachea midline  CV:   RRR. No m/r/g. No JVD , no edema   Lungs:   CTAB. No wheezing, rhonchi, or rales. Appears even, unlabored anteiror   Abdomen: Bowel sounds present. Soft, nontender, nondistended. Extremities: Warm and dry. No cyanosis or clubbing. No edema. Skin:     No rashes. Normal turgor.   Normal coloration  Neuro:  Not verbal, nods when asked her name , tremulous, in mitts   Psych:  Unable to assess     Data Ordered and Personally Reviewed:    Last 24hr Labs:  Recent Results (from the past 24 hour(s))   PTT    Collection Time: 08/06/21  7:11 PM   Result Value Ref Range    aPTT 26.7 24.1 - 35.1 SEC   PROTHROMBIN TIME + INR    Collection Time: 08/06/21  7:11 PM   Result Value Ref Range    Prothrombin time 15.3 (H) 12.5 - 14.7 sec    INR 1.2     METABOLIC PANEL, BASIC    Collection Time: 08/07/21  3:15 AM   Result Value Ref Range    Sodium 143 136 - 145 mmol/L    Potassium 3.4 (L) 3.5 - 5.1 mmol/L    Chloride 114 (H) 98 - 107 mmol/L    CO2 22 21 - 32 mmol/L    Anion gap 7 7 - 16 mmol/L    Glucose 147 (H) 65 - 100 mg/dL    BUN 14 8 - 23 MG/DL    Creatinine 0.65 0.6 - 1.0 MG/DL    GFR est AA >60 >60 ml/min/1.73m2    GFR est non-AA >60 >60 ml/min/1.73m2    Calcium 8.0 (L) 8.3 - 10.4 MG/DL   CBC WITH AUTOMATED DIFF    Collection Time: 08/07/21  3:15 AM   Result Value Ref Range    WBC 15.2 (H) 4.3 - 11.1 K/uL    RBC 3.06 (L) 4.05 - 5.2 M/uL    HGB 8.8 (L) 11.7 - 15.4 g/dL    HCT 26.7 (L) 35.8 - 46.3 %    MCV 87.3 79.6 - 97.8 FL    MCH 28.8 26.1 - 32.9 PG    MCHC 33.0 31.4 - 35.0 g/dL    RDW 13.2 11.9 - 14.6 %    PLATELET 935 489 - 968 K/uL    MPV 8.2 (L) 9.4 - 12.3 FL    ABSOLUTE NRBC 0.00 0.0 - 0.2 K/uL    DF AUTOMATED      NEUTROPHILS 86 (H) 43 - 78 %    LYMPHOCYTES 8 (L) 13 - 44 %    MONOCYTES 5 4.0 - 12.0 %    EOSINOPHILS 0 (L) 0.5 - 7.8 %    BASOPHILS 0 0.0 - 2.0 %    IMMATURE GRANULOCYTES 1 0.0 - 5.0 %    ABS. NEUTROPHILS 13.1 (H) 1.7 - 8.2 K/UL    ABS. LYMPHOCYTES 1.2 0.5 - 4.6 K/UL    ABS. MONOCYTES 0.8 0.1 - 1.3 K/UL    ABS. EOSINOPHILS 0.0 0.0 - 0.8 K/UL    ABS. BASOPHILS 0.0 0.0 - 0.2 K/UL    ABS. IMM.  GRANS. 0.1 0.0 - 0.5 K/UL   C. DIFFICILE AG & TOXIN A/B    Collection Time: 08/07/21  9:47 AM   Result Value Ref Range    GDH ANTIGEN C. DIFFICILE GDH ANTIGEN-NEGATIVE      C. difficile toxin C. DIFFICILE TOXIN-NEGATIVE      PCR Reflex NOT APPLICABLE      INTERPRETATION NEGATIVE FOR TOXIGENIC C. DIFFICILE NTXCD      Clinical Consideration NEGATIVE FOR TOXIGENIC C. DIFFICILE OCCULT BLOOD, STOOL    Collection Time: 08/07/21  9:47 AM   Result Value Ref Range    Occult blood, stool Negative NEG     HGB & HCT    Collection Time: 08/07/21  4:10 PM   Result Value Ref Range    HGB 9.2 (L) 11.7 - 15.4 g/dL    HCT 27.8 (L) 35.8 - 46.3 %       All Micro Results     Procedure Component Value Units Date/Time    CULTURE, URINE [[id number]]     Order Status: Sent Specimen: Urine from Clean catch     C. DIFFICILE AG & TOXIN A/B [[id number]] Collected: 08/07/21 0947    Order Status: Completed Specimen: Stool Updated: 08/07/21 1234     [address] ANTIGEN       C. DIFFICILE GDH ANTIGEN-NEGATIVE           C. difficile toxin       C. DIFFICILE TOXIN-NEGATIVE           PCR Reflex NOT APPLICABLE        INTERPRETATION       NEGATIVE FOR TOXIGENIC C. DIFFICILE           Clinical Consideration       NEGATIVE FOR TOXIGENIC C. DIFFICILE                Other Studies:  No results found. Current Meds:  Current Facility-Administered Medications   Medication Dose Route Frequency    dextrose 5 % - 0.45% NaCl infusion  50 mL/hr IntraVENous CONTINUOUS    cefTRIAXone (ROCEPHIN) 1 g in 0.9% sodium chloride (MBP/ADV) 50 mL MBP  1 g IntraVENous Q24H    sodium chloride (NS) flush 5-40 mL  5-40 mL IntraVENous Q8H    sodium chloride (NS) flush 5-40 mL  5-40 mL IntraVENous PRN    acetaminophen (TYLENOL) tablet 650 mg  650 mg Oral Q6H PRN    Or    acetaminophen (TYLENOL) suppository 650 mg  650 mg Rectal Q6H PRN    polyethylene glycol (MIRALAX) packet 17 g  17 g Oral DAILY PRN    ondansetron (ZOFRAN ODT) tablet 4 mg  4 mg Oral Q8H PRN    Or    ondansetron (ZOFRAN) injection 4 mg  4 mg IntraVENous Q6H PRN       Signed:  Minnie Harrell MD    Part of this note may have been written by using a voice dictation software. The note has been proof read but may still contain some grammatical/other typographical errors.

## 2021-08-07 NOTE — PROGRESS NOTES
08/07/21 1449   Dual Skin Pressure Injury Assessment   Dual Skin Pressure Injury Assessment X   Second Care Provider (Based on [address] [place] [address]) Alina SHAVER   Buttocks/Ishium  Bilateral  (redness/rash on buttocks; barrier cream placed at this time)   Skin Integumentary   Skin Integumentary (WDL) X    Pressure  Injury Documentation No Pressure Injury Noted-Pressure Ulcer Prevention Initiated   Skin Color Appropriate for ethnicity; Ecchymosis (comment); Red  (red rash/excoriation on buttocks)   Skin Condition/Temp Dry;Fragile; Warm;Inflamed;Rash  (inflammed/rash on buttocks)   Skin Integrity Cracked; Excoriation;Scars (comment)   Turgor Epidermis thin w/ loss of subcut tissue   Wound Prevention and Protection Methods   Orientation of Wound Prevention Posterior   Location of Wound Prevention Sacrum/Coccyx; Buttocks   Dressing Present  Yes;Changed   Dressing Status Changed   Wound Offloading (Prevention Methods) Bed, pressure reduction mattress; Foam silicone;Pillows;Repositioning; Other (comment)  (barrier cream)

## 2021-08-07 NOTE — PROGRESS NOTES
TRANSFER - OUT REPORT:    Verbal report given to Anamika Cobian RN(name) on Tracey Arriaga  being transferred to 204(unit) for routine progression of care       Report consisted of patients Situation, Background, Assessment and   Recommendations(SBAR). Information from the following report(s) SBAR, Kardex, ED Summary, Intake/Output, MAR, Recent Results and Cardiac Rhythm S. Tach was reviewed with the receiving nurse. Lines:   Peripheral IV 08/06/21 Right Antecubital (Active)   Site Assessment Clean, dry, & intact 08/07/21 0725   Phlebitis Assessment 0 08/07/21 0725   Infiltration Assessment 0 08/07/21 0725   Dressing Status Clean, dry, & intact 08/07/21 0725   Dressing Type Tape;Transparent 08/07/21 0725   Hub Color/Line Status Pink;Capped 08/07/21 0725       Peripheral IV 08/06/21 Left Forearm (Active)   Site Assessment Clean, dry, & intact 08/07/21 0725   Phlebitis Assessment 0 08/07/21 0725   Infiltration Assessment 0 08/07/21 0725   Dressing Status Clean, dry, & intact 08/07/21 0725   Dressing Type Tape;Transparent 08/07/21 0725   Hub Color/Line Status Pink;Capped 08/07/21 0725        Opportunity for questions and clarification was provided.       Patient transported with:  Belongings; chart; family member

## 2021-08-07 NOTE — PROGRESS NOTES
TRANSFER - IN REPORT:    Verbal report received from SIVAKUMAR Berger(name) on Janey Garza  being received from ICU ([id number])(unit) for routine progression of care      Report consisted of patients Situation, Background, Assessment and   Recommendations(SBAR). Information from the following report(s) SBAR, Kardex, Intake/Output, MAR, Recent Results and Cardiac Rhythm Sinus Tach was reviewed with the receiving nurse. Opportunity for questions and clarification was provided. Assessment completed upon patients arrival to unit and care assumed.

## 2021-08-07 NOTE — PROGRESS NOTES
END OF SHIFT NOTE:    INTAKE/OUTPUT  08/06 0701 - 08/07 0700  In: -   Out: 350 [Urine:350]  Voiding: YES  Catheter: YES  Drain:              Flatus: Patient does have flatus present. Stool:  1 occurrences. Characteristics:  Stool Assessment  Stool Color: Brandi Ban  Stool Appearance: Watery, Loose  Stool Amount: Large  Stool Source/Status: Rectum, Incontinence    Emesis: 0 occurrences. Characteristics:        VITAL SIGNS  Patient Vitals for the past 12 hrs:   Temp Pulse Resp BP SpO2   08/07/21 1809 97.3 °F (36.3 °C) (!) 130 -- (!) 152/81 92 %   08/07/21 1602 97.9 °F (36.6 °C) (!) 121 20 (!) 144/72 96 %   08/07/21 1444 99 °F (37.2 °C) (!) 124 20 133/67 96 %   08/07/21 1401 -- (!) 115 26 (!) 147/75 97 %   08/07/21 1346 -- (!) 123 (!) 32 (!) 146/66 95 %   08/07/21 1332 -- (!) 111 26 135/63 96 %   08/07/21 1316 -- (!) 117 27 (!) 153/69 96 %   08/07/21 1301 -- (!) 119 27 (!) 173/65 97 %   08/07/21 1247 -- (!) 129 (!) 35 (!) 178/71 97 %   08/07/21 1157 98.9 °F (37.2 °C) -- -- -- --   08/07/21 1047 -- (!) 116 26 135/65 97 %   08/07/21 1032 -- (!) 121 25 125/69 97 %   08/07/21 1016 -- (!) 118 28 120/61 96 %   08/07/21 1001 -- (!) 116 28 124/68 97 %   08/07/21 0947 -- (!) 118 25 125/63 96 %   08/07/21 0932 -- (!) 133 24 124/66 93 %   08/07/21 0916 -- (!) 113 (!) 33 (!) 116/57 95 %   08/07/21 0901 -- (!) 115 29 (!) 117/58 96 %   08/07/21 0847 -- (!) 112 26 (!) 111/59 96 %   08/07/21 0832 -- (!) 113 22 (!) 113/57 97 %   08/07/21 0816 -- (!) 118 20 (!) 110/54 95 %   08/07/21 0801 -- (!) 125 30 (!) 93/54 96 %   08/07/21 0747 -- (!) 120 28 (!) 104/58 95 %   08/07/21 0733 99.1 °F (37.3 °C) (!) 118 26 (!) 101/55 95 %       Pain Assessment  Pain Intensity 1: 0 (08/07/21 0733)             Ambulating  No    Shift report given to oncoming nurse at the bedside.     Cierra Salcedo RN

## 2021-08-08 LAB
ANION GAP SERPL CALC-SCNC: 6 MMOL/L (ref 7–16)
BASOPHILS # BLD: 0 K/UL (ref 0–0.2)
BASOPHILS NFR BLD: 0 % (ref 0–2)
BUN SERPL-MCNC: 5 MG/DL (ref 8–23)
CALCIUM SERPL-MCNC: 8.5 MG/DL (ref 8.3–10.4)
CHLORIDE SERPL-SCNC: 113 MMOL/L (ref 98–107)
CO2 SERPL-SCNC: 24 MMOL/L (ref 21–32)
CREAT SERPL-MCNC: 0.56 MG/DL (ref 0.6–1)
DIFFERENTIAL METHOD BLD: ABNORMAL
EOSINOPHIL # BLD: 0.1 K/UL (ref 0–0.8)
EOSINOPHIL NFR BLD: 1 % (ref 0.5–7.8)
ERYTHROCYTE [DISTWIDTH] IN BLOOD BY AUTOMATED COUNT: 13.5 % (ref 11.9–14.6)
GLUCOSE SERPL-MCNC: 113 MG/DL (ref 65–100)
HCT VFR BLD AUTO: 32.3 % (ref 35.8–46.3)
HGB BLD-MCNC: 10.5 G/DL (ref 11.7–15.4)
IMM GRANULOCYTES # BLD AUTO: 0.1 K/UL (ref 0–0.5)
IMM GRANULOCYTES NFR BLD AUTO: 1 % (ref 0–5)
LYMPHOCYTES # BLD: 1.6 K/UL (ref 0.5–4.6)
LYMPHOCYTES NFR BLD: 14 % (ref 13–44)
MAGNESIUM SERPL-MCNC: 1.7 MG/DL (ref 1.8–2.4)
MCH RBC QN AUTO: 28.5 PG (ref 26.1–32.9)
MCHC RBC AUTO-ENTMCNC: 32.5 G/DL (ref 31.4–35)
MCV RBC AUTO: 87.8 FL (ref 79.6–97.8)
MONOCYTES # BLD: 1 K/UL (ref 0.1–1.3)
MONOCYTES NFR BLD: 9 % (ref 4–12)
NEUTS SEG # BLD: 8.2 K/UL (ref 1.7–8.2)
NEUTS SEG NFR BLD: 75 % (ref 43–78)
NRBC # BLD: 0 K/UL (ref 0–0.2)
PLATELET # BLD AUTO: 310 K/UL (ref 150–450)
PMV BLD AUTO: 8.5 FL (ref 9.4–12.3)
POTASSIUM SERPL-SCNC: 3.1 MMOL/L (ref 3.5–5.1)
RBC # BLD AUTO: 3.68 M/UL (ref 4.05–5.2)
SODIUM SERPL-SCNC: 143 MMOL/L (ref 136–145)
WBC # BLD AUTO: 11 K/UL (ref 4.3–11.1)

## 2021-08-08 PROCEDURE — 74011250637 HC RX REV CODE- 250/637: Performed by: INTERNAL MEDICINE

## 2021-08-08 PROCEDURE — 74011250636 HC RX REV CODE- 250/636: Performed by: INTERNAL MEDICINE

## 2021-08-08 PROCEDURE — 74011250637 HC RX REV CODE- 250/637: Performed by: FAMILY MEDICINE

## 2021-08-08 PROCEDURE — 74011000258 HC RX REV CODE- 258: Performed by: INTERNAL MEDICINE

## 2021-08-08 PROCEDURE — 85025 COMPLETE CBC W/AUTO DIFF WBC: CPT

## 2021-08-08 PROCEDURE — 97530 THERAPEUTIC ACTIVITIES: CPT

## 2021-08-08 PROCEDURE — 74011000250 HC RX REV CODE- 250: Performed by: INTERNAL MEDICINE

## 2021-08-08 PROCEDURE — 36415 COLL VENOUS BLD VENIPUNCTURE: CPT

## 2021-08-08 PROCEDURE — 83735 ASSAY OF MAGNESIUM: CPT

## 2021-08-08 PROCEDURE — 80048 BASIC METABOLIC PNL TOTAL CA: CPT

## 2021-08-08 PROCEDURE — 97165 OT EVAL LOW COMPLEX 30 MIN: CPT

## 2021-08-08 PROCEDURE — 87086 URINE CULTURE/COLONY COUNT: CPT

## 2021-08-08 PROCEDURE — 65270000029 HC RM PRIVATE

## 2021-08-08 PROCEDURE — 97162 PT EVAL MOD COMPLEX 30 MIN: CPT

## 2021-08-08 PROCEDURE — 97112 NEUROMUSCULAR REEDUCATION: CPT

## 2021-08-08 RX ORDER — POTASSIUM CHLORIDE 20 MEQ/1
40 TABLET, EXTENDED RELEASE ORAL DAILY
Status: DISCONTINUED | OUTPATIENT
Start: 2021-08-08 | End: 2021-08-10

## 2021-08-08 RX ADMIN — DEXTROSE MONOHYDRATE AND SODIUM CHLORIDE 50 ML/HR: 5; .45 INJECTION, SOLUTION INTRAVENOUS at 05:53

## 2021-08-08 RX ADMIN — RDII 250 MG CAPSULE 500 MG: at 08:59

## 2021-08-08 RX ADMIN — Medication 5 MG: at 21:04

## 2021-08-08 RX ADMIN — RDII 250 MG CAPSULE 500 MG: at 17:32

## 2021-08-08 RX ADMIN — CEFTRIAXONE 1 G: 1 INJECTION, POWDER, FOR SOLUTION INTRAMUSCULAR; INTRAVENOUS at 17:32

## 2021-08-08 RX ADMIN — Medication 10 ML: at 21:04

## 2021-08-08 RX ADMIN — Medication 10 ML: at 05:53

## 2021-08-08 RX ADMIN — Medication 10 ML: at 13:37

## 2021-08-08 RX ADMIN — POTASSIUM CHLORIDE 40 MEQ: 20 TABLET, EXTENDED RELEASE ORAL at 13:37

## 2021-08-08 NOTE — PROGRESS NOTES
Patient . Dr. Francisco Scherer notified via [employer]. Received call form Dr. Cb Jarrett and stated to continue to monitor.

## 2021-08-08 NOTE — PROGRESS NOTES
Problem: Pressure Injury - Risk of  Goal: *Prevention of pressure injury  Description: Document Bob Scale and appropriate interventions in the flowsheet. Outcome: Progressing Towards Goal  Note: Pressure Injury Interventions:  Sensory Interventions: Assess changes in LOC, Avoid rigorous massage over bony prominences    Moisture Interventions: Absorbent underpads, Apply protective barrier, creams and emollients    Activity Interventions: Increase time out of bed    Mobility Interventions: Pressure redistribution bed/mattress (bed type)    Nutrition Interventions: Document food/fluid/supplement intake                     Problem: Falls - Risk of  Goal: *Absence of Falls  Description: Document Vivek Fall Risk and appropriate interventions in the flowsheet.   Outcome: Progressing Towards Goal  Note: Fall Risk Interventions:  Mobility Interventions: Bed/chair exit alarm, Communicate number of staff needed for ambulation/transfer, Patient to call before getting OOB    Mentation Interventions: Bed/chair exit alarm, Adequate sleep, hydration, pain control, Evaluate medications/consider consulting pharmacy    Medication Interventions: Bed/chair exit alarm, Evaluate medications/consider consulting pharmacy, Patient to call before getting OOB    Elimination Interventions: Patient to call for help with toileting needs, Call light in reach, Bed/chair exit alarm              Problem: Non-Violent Restraints  Goal: Removal from restraints as soon as assessed to be safe  Outcome: Progressing Towards Goal     Problem: Non-Violent Restraints  Goal: No harm/injury to patient while restraints in use  Outcome: Progressing Towards Goal     Problem: Non-Violent Restraints  Goal: Patient's dignity will be maintained  Outcome: Progressing Towards Goal     Problem: Risk for Spread of Infection  Goal: Prevent transmission of infectious organism to others  Description: Prevent the transmission of infectious organisms to other patients, staff members, and visitors.   Outcome: Progressing Towards Goal     Problem: General Medical Care Plan  Goal: *Skin integrity maintained  Outcome: Progressing Towards Goal

## 2021-08-08 NOTE — PROGRESS NOTES
[facility] CRITICAL CARE OUTREACH NURSE PROGRESS REPORT      SUBJECTIVE: Called to assess patient secondary to outreach protocol. MEWS Score: 4 (08/07/21 1918)  Vitals:    08/07/21 1602 08/07/21 1809 08/07/21 1918 08/07/21 2057   BP: (!) 144/72 (!) 152/81 121/86    Pulse: (!) 121 (!) 130 (!) 136 (!) 118   Resp: 20  20    Temp: 97.9 °F (36.6 °C) 97.3 °F (36.3 °C) 97.4 °F (36.3 °C)    SpO2: 96% 92% 93%       EKG: normal EKG, normal sinus rhythm, unchanged from previous tracings. LAB DATA:    Recent Labs     08/07/21  0315 08/06/21  1431    142   K 3.4* 3.4*   * 107   CO2 22 32   AGAP 7 3*   * 122*   BUN 14 15   CREA 0.65 0.71   GFRAA >60 >60   GFRNA >60 >60   CA 8.0* 9.2   MG  --  1.9   ALB  --  3.2   TP  --  7.0   GLOB  --  3.8*   AGRAT  --  0.8*   ALT  --  17        Recent Labs     08/07/21  1610 08/07/21  0315 08/06/21  1431   WBC  --  15.2* 10.3   HGB 9.2* 8.8* 11.9   HCT 27.8* 26.7* 37.4   PLT  --  303 399          OBJECTIVE: On arrival to room, I found patient to be in bed resting. Pain Assessment  Pain Intensity 1: 0 (08/07/21 0733)                                          ASSESSMENT:  Patient in bed resting quietly.  at bedside. Breath sounds coarse bilateral. Denies pain. Patient pleasantly confused. No distress noted. PLAN:  Will continue to monitor per outreach protocol.

## 2021-08-08 NOTE — PROGRESS NOTES
Date of Outreach Update:  Ruddy Maher was seen and assessed. MEWS Score: 2 (08/08/21 0321)  Vitals:    08/07/21 1918 08/07/21 2057 08/07/21 2345 08/08/21 0321   BP: 121/86  (!) 145/70 (!) 140/71   Pulse: (!) 136 (!) 118 (!) 109 (!) 106   Resp: 20 18 18   Temp: 97.4 °F (36.3 °C)  98.8 °F (37.1 °C) 98.7 °F (37.1 °C)   SpO2: 93%  95% 95%         Pain Assessment  Pain Intensity 1: 0 (08/07/21 2345)             Previous Outreach assessment has been reviewed. There have been no significant clinical changes since the completion of the last dated Outreach assessment. Will continue to follow up per outreach protocol.     Signed By:   Katalina Kenney RN    August 8, 2021 3:58 AM

## 2021-08-08 NOTE — PROGRESS NOTES
END OF SHIFT NOTE:    INTAKE/OUTPUT  08/07 0701 - 08/08 0700  In: 151 [I.V.:151]  Out: 2050 [Urine:2050]  Voiding: NO  Catheter: YES  Drain:              Flatus: Patient does have flatus present. Stool:  2 occurrences. Characteristics:  Stool Assessment  Stool Color: Dennie Llewellyn  Stool Appearance: Watery, Loose  Stool Amount: Large  Stool Source/Status: Rectum, Incontinence    Emesis: 0 occurrences. Characteristics:        VITAL SIGNS  Patient Vitals for the past 12 hrs:   Temp Pulse Resp BP SpO2   08/08/21 1811 97.4 °F (36.3 °C) (!) 114 -- 110/77 96 %   08/08/21 1613 97.8 °F (36.6 °C) (!) 120 18 103/60 96 %   08/08/21 1137 98 °F (36.7 °C) (!) 109 20 132/77 94 %   08/08/21 0726 97.9 °F (36.6 °C) (!) 117 20 132/86 99 %       Pain Assessment  Pain Intensity 1: 0 (08/08/21 1422)        Patient Stated Pain Goal: 0    Ambulating  Yes    Shift report given to oncoming nurse at the bedside.     Garland Abbasi RN

## 2021-08-08 NOTE — PROGRESS NOTES
ACUTE PHYSICAL THERAPY GOALS:  (Developed with and agreed upon by patient and/or caregiver.)  (1.) Catracho Dacosta will move from supine to sit and sit to supine, scoot up and down and roll side to side with STAND BY ASSIST within 7 treatment day(s). (2.) Catracho Dacosta will transfer from bed to chair and chair to bed with STAND BY ASSIST using the least restrictive device within 7 treatment day(s). (3.) Catracho Dacosta will ambulate with STAND BY ASSIST for 100 feet with the least restrictive device within 7 treatment day(s). (4.) Catracho Dacosta will perform standing static and dynamic balance activities x 15 minutes with STAND BY ASSIST to improve safety within 7 treatment day(s). PHYSICAL THERAPY ASSESSMENT: Initial Assessment PT Treatment Day # 1    Catracho Dacosta is a 78 y.o. female   PRIMARY DIAGNOSIS: Overdose  Overdose [T50.901A]     Reason for Referral:   ICD-10: Treatment Diagnosis: Other lack of cordination (R27.8)  Difficulty in walking, Not elsewhere classified (R26.2)  Other abnormalities of gait and mobility (R26.89)  [place] / [place] / Product Type: Medicare /     ASSESSMENT:     REHAB RECOMMENDATIONS:   Recommendation to date pending progress:  Setting:   Short-term Rehab  Equipment:    To Be Determined     PRIOR LEVEL OF FUNCTION:  (Prior to Hospitalization) INITIAL/CURRENT LEVEL OF FUNCTION:  (Most Recently Demonstrated)   Bed Mobility:   Supervision  Sit to Stand:   Independent  Transfers:   Supervision  Gait/Mobility:   Supervision Bed Mobility:   Maximal Assistance x 2  Sit to Stand:   Maximal Assistance x 2  Transfers:   Maximal Assistance x 2  Gait/Mobility:   Maximal Assistance x 2 for steps to chair     ASSESSMENT:  Ms. Aracely Roach is a 78year old F who presents to hospital after she was discovered to have swallowed too many pills (PMH includes advanced dementia, this was unintentional).  Pt's supportive  at bedside for evaluation, states at baseline she is ambulatory with no assist, no falls. This date pt performs mobility including bed mobility, sitting balance activities, sit <> stand transfer and steps to chair with Max Ax2. Biggest limitation in mobility appears to be motor planning, initiation, and task sequencing rather than functional strength. Pt perseverating on having to urinate (presence of Humphreys catheter noted) and temperature of the room. Pt presents as functioning below her baseline, with deficits in mobility including transfers, gait, balance, and activity tolerance. Pt will benefit from skilled therapy services to address stated deficits to promote return to highest level of function, independence, and safety. Will continue to follow. SUBJECTIVE:   Ms. Cyn Diggs states, \"I am cold. \"    SOCIAL HISTORY/LIVING ENVIRONMENT: Lives with supportive . Independent with mobility, no falls per 's report. Pt is a limited historian due to her dementia.   Home Environment: Private residence  One/Two Story Residence: One story  Living Alone: No  Support Systems: Family member(s), Spouse/Significant Other/Partner  OBJECTIVE:     PAIN: VITAL SIGNS: LINES/DRAINS:   Pre Treatment: Pain Screen  Pain Scale 1: Numeric (0 - 10)  Pain Intensity 1: 0  Post Treatment: 0/10 Vital Signs  O2 Device: None (Room air) Humphreys Catheter and IV  O2 Device: None (Room air)     GROSS EVALUATION:  BLE Within Functional Limits Abnormal/ Functional Abnormal/ Non-Functional (see comments) Not Tested Comments:   AROM [x] [] [] []    PROM [x] [] [] []    Strength [x] [] [] []    Balance [] [x] [] []  posterior lean   Posture [x] [] [] []    Sensation [] [] [] [x]    Coordination [] [x] [] []  decreased initiation and motor planning   Tone [] [] [] [x]    Edema [] [] [] [x]    Activity Tolerance [] [x] [] []     [] [] [] []      COGNITION/  PERCEPTION: Intact Impaired   (see comments) Comments:   Orientation [] [x]   Alert, disoriented x4 Vision [x] []    Hearing [x] []    Command Following [] [x]  limited   Safety Awareness [] [x]  absent    [] []      MOBILITY: I Mod I S SBA CGA Min Mod Max Total  NT x2 Comments:   Bed Mobility    Rolling [] [] [] [] [] [] [] [x] [] [] [x]    Supine to Sit [] [] [] [] [] [] [] [x] [] [] [x]    Scooting [] [] [] [] [] [] [] [x] [] [] [x]    Sit to Supine [] [] [] [] [] [] [] [x] [] [] [x]    Transfers    Sit to Stand [] [] [] [] [] [] [] [x] [] [] [x]    Bed to Chair [] [] [] [] [] [] [] [x] [] [] [x]    Stand to Sit [] [] [] [] [] [] [] [x] [] [] [x]    I=Independent, Mod I=Modified Independent, S=Supervision, SBA=Standby Assistance, CGA=Contact Guard Assistance,   Min=Minimal Assistance, Mod=Moderate Assistance, Max=Maximal Assistance, Total=Total Assistance, NT=Not Tested  GAIT: I Mod I S SBA CGA Min Mod Max Total  NT x2 Comments:   Level of Assistance [] [] [] [] [] [] [] [x] [] [] [x]    Distance 5 ft    DME Gait Belt and hand held assist x2    Gait Quality Short, shuffled, delayed steps    Weightbearing Status N/A     I=Independent, Mod I=Modified Independent, S=Supervision, SBA=Standby Assistance, CGA=Contact Guard Assistance,   Min=Minimal Assistance, Mod=Moderate Assistance, Max=Maximal Assistance, Total=Total Assistance, NT=Not [facility]       How much difficulty does the patient currently have. .. Unable A Lot A Little None   1. Turning over in bed (including adjusting bedclothes, sheets and blankets)? [] 1   [x] 2   [] 3   [] 4   2. Sitting down on and standing up from a chair with arms ( e.g., wheelchair, bedside commode, etc.)   [] 1   [x] 2   [] 3   [] 4   3. Moving from lying on back to sitting on the side of the bed? [] 1   [x] 2   [] 3   [] 4   How much help from another person does the patient currently need. .. Total A Lot A Little None   4. Moving to and from a bed to a chair (including a wheelchair)?    [] 1   [x] 2   [] 3   [] 4   5. Need to walk in hospital room? [] 1   [x] 2   [] 3   [] 4   6. Climbing 3-5 steps with a railing? [x] 1   [] 2   [] 3   [] 4   © 2007, Trustees of [address] [place] [id number], under license to [employer]. All rights reserved     Score:  Initial: 11 Most Recent: X (Date: -- )    Interpretation of Tool:  Represents activities that are increasingly more difficult (i.e. Bed mobility, Transfers, Gait). PLAN:   FREQUENCY/DURATION: PT Plan of Care: 3 times/week for duration of hospital stay or until stated goals are met, whichever comes first.    PROBLEM LIST:   (Skilled intervention is medically necessary to address:)  1. Decreased Activity Tolerance  2. Decreased Balance  3. Decreased Cognition  4. Decreased Coordination  5. Decreased Gait Ability  6. Decreased Strength  7. Decreased Transfer Abilities   INTERVENTIONS PLANNED:   (Benefits and precautions of physical therapy have been discussed with the patient.)  1. Therapeutic Activity  2. Therapeutic Exercise/HEP  3. Neuromuscular Re-education  4. Gait Training  5. Education     TREATMENT:     EVALUATION: Moderate Complexity : (Untimed Charge)    TREATMENT:   ($$ Therapeutic Activity: 8-22 mins    )  Co-Treatment PT/OT necessary due to patient's decreased overall endurance/tolerance levels, as well as need for high level skilled assistance to complete functional transfers/mobility and functional tasks  Therapeutic Activity (10 Minutes): Therapeutic activity included Supine to Sit, Scooting, Transfer Training, Ambulation on level ground, Sitting balance  and Standing balance to improve functional Mobility, Strength, Activity tolerance and coordination.     TREATMENT GRID:  N/A    AFTER TREATMENT POSITION/PRECAUTIONS:  Alarm Activated, Chair, Needs within reach, RN notified and Visitors at bedside    INTERDISCIPLINARY COLLABORATION:  RN/PCT, PT/PTA and OT/HUANG    TOTAL TREATMENT DURATION:  PT Patient Time In/Time Out  Time In: 1421  Time Out: [address] [place] [address]

## 2021-08-08 NOTE — PROGRESS NOTES
Date of Outreach Update:  Cristina Estrella was seen and assessed. MEWS Score: 2 (08/08/21 0321)  Vitals:    08/08/21 0726 08/08/21 1137 08/08/21 1613 08/08/21 1811   BP: 132/86 132/77 103/60 110/77   Pulse: (!) 117 (!) 109 (!) 120 (!) 114   Resp: 20 20 18    Temp: 97.9 °F (36.6 °C) 98 °F (36.7 °C) 97.8 °F (36.6 °C) 97.4 °F (36.3 °C)   SpO2: 99% 94% 96% 96%         Pain Assessment  Pain Intensity 1: 0 (08/08/21 1422)        Patient Stated Pain Goal: 0      Previous Outreach assessment has been reviewed. There have been no significant clinical changes since the completion of the last dated Outreach assessment. Will continue to follow up per outreach protocol.     Signed By:   Ken Lott    August 8, 2021 7:35 PM

## 2021-08-08 NOTE — PROGRESS NOTES
Hospitalist Progress Note   Admit Date:  2021  2:28 PM   Name:  Janey Garza   Age:  78 y.o. Sex:  female  :  1950   MRN:  [id number]     Presenting Complaint: Drug Overdose    Reason(s) for Admission: Overdose [address] Course & Interval History:     Patient with past medical history of    Alzheimer's dementia    Patient was admitted initially for cilostazol overdose. She mistakenly took 60 tablets of cilostazole at home. She was hypotensive initially and admitted to ICU and was on Levophed. She is improved and levophed was stopped and she is moved out of ICU. She is found to have drop in Hb. No overt bleeding is noted. Subjective (21):    21   Patient is resting in bed. No talking. She opens eyes with verbal stimulation. No fever. No shaking. No chills. No new complaints. Assessment & Plan:     Principal Problem:    Overdose (2021)    Accidental overdose. BP is improved and holding up. No vasopressor needed. Persistent tachycardia, although better. EKG shows sinus tachycardia. Monitor closely. 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 Patient is resting calmly. Anemia (2021)  Hb seems holding up   No active bleeding   Monitor       Hypokalemia (2021)    Without SHEELA   Will provide K supplementation  Give KCl 40 mEq po daily. UTI (urinary tract infection) (2021)  Patient could not tell symptoms   She is started on Rocephin on 2021. Will continue and follow up on culture result. Diarrhea (2021)  Monitor and symptomatic treatment. On Florastor. Check C. Difficile if continues to have diarrhea. Hyperglycemia (2021)  BS has been in low 100's ranges which is acceptable now. Will monitor. Hypotension (2021)  From overdose. Resolved and stable now. Monitor.        Dispo/Discharge Planning:  to be determined   Will have physical therapy work on patient. 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 ([facility] [address] 08/06/2021       Objective:     Patient Vitals for the past 24 hrs:   Temp Pulse Resp BP SpO2   08/08/21 1137 98 °F (36.7 °C) (!) 109 20 132/77 94 %   08/08/21 0726 97.9 °F (36.6 °C) (!) 117 20 132/86 99 %   08/08/21 0321 98.7 °F (37.1 °C) (!) 106 18 (!) 140/71 95 %   08/07/21 2345 98.8 °F (37.1 °C) (!) 109 18 (!) 145/70 95 %   08/07/21 2057 -- (!) 118 -- -- --   08/07/21 1918 97.4 °F (36.3 °C) (!) 136 20 121/86 93 %   08/07/21 1809 97.3 °F (36.3 °C) (!) 130 -- (!) 152/81 92 %   08/07/21 1602 97.9 °F (36.6 °C) (!) 121 20 (!) 144/72 96 %   08/07/21 1444 99 °F (37.2 °C) (!) 124 20 133/67 96 %   08/07/21 1401 -- (!) 115 26 (!) 147/75 97 %   08/07/21 1346 -- (!) 123 (!) 32 (!) 146/66 95 %   08/07/21 1332 -- (!) 111 26 135/63 96 %     Oxygen Therapy  O2 Sat (%): 94 % (08/08/21 1137)  Pulse via Oximetry: 113 beats per minute (08/07/21 1401)  O2 Device: None (Room air) (08/07/21 1925)    Estimated body mass index is 19.09 kg/m² as calculated from the following:    Height as of 11/16/16: 5' 2.5\" (1.588 m). Weight as of 11/16/16: 48.1 kg (106 lb 1 oz). Intake/Output Summary (Last 24 hours) at 8/8/2021 1318  Last data filed at 8/8/2021 1137  Gross per 24 hour   Intake 979 ml   Output 1900 ml   Net -921 ml         Physical Exam:     General:    Well nourished. No overt distress.  She is resting quietly in bed and opens eyes on verbal stimulations. Afebrile. Head:  Normocephalic, atraumatic  Eyes:  Sclerae appear normal.  Pupils equally round. HENT:  Nares appear normal, no drainage. Moist mucous membranes  Neck:  No restricted ROM. Trachea midline  CV:   RRR. No m/r/g. No JVD  Lungs:   CTAB. No wheezing, rhonchi, or rales. Appears even, unlabored  Abdomen: Bowel sounds present. Soft, nontender, nondistended. Extremities: Warm and dry. No cyanosis or clubbing. No edema. Skin:     No rashes. Normal turgor. Normal coloration  Neuro:  Cranial nerves II-XII grossly intact. No observed motor weakness.      Data Ordered and Personally Reviewed:    Last 24hr Labs:  Recent Results (from the past 24 hour(s))   HGB & HCT    Collection Time: 08/07/21  4:10 PM   Result Value Ref Range    HGB 9.2 (L) 11.7 - 15.4 g/dL    HCT 27.8 (L) 35.8 - 46.3 %   EKG, 12 LEAD, INITIAL    Collection Time: 08/07/21  4:51 PM   Result Value Ref Range    Ventricular Rate 127 BPM    Atrial Rate 127 BPM    P-R Interval 124 ms    QRS Duration 70 ms    Q-T Interval 270 ms    QTC Calculation (Bezet) 392 ms    Calculated P Axis 80 degrees    Calculated R Axis 40 degrees    Calculated T Axis 89 degrees    Diagnosis       Sinus tachycardia  Otherwise normal ECG  When compared with ECG of 06-AUG-2021 14:28,  QRS axis Shifted right  Confirmed by EMERITA MCCOY (), RICHARD SANCHEZ ([id number]) on 8/7/2021 5:22:27 PM     MAGNESIUM    Collection Time: 08/08/21  3:21 AM   Result Value Ref Range    Magnesium 1.7 (L) 1.8 - 2.4 mg/dL   METABOLIC PANEL, BASIC    Collection Time: 08/08/21  3:21 AM   Result Value Ref Range    Sodium 143 136 - 145 mmol/L    Potassium 3.1 (L) 3.5 - 5.1 mmol/L    Chloride 113 (H) 98 - 107 mmol/L    CO2 24 21 - 32 mmol/L    Anion gap 6 (L) 7 - 16 mmol/L    Glucose 113 (H) 65 - 100 mg/dL    BUN 5 (L) 8 - 23 MG/DL    Creatinine 0.56 (L) 0.6 - 1.0 MG/DL    GFR est AA >60 >60 ml/min/1.73m2    GFR est non-AA >60 >60 ml/min/1.73m2    Calcium 8.5 8.3 - 10.4 MG/DL   CBC WITH AUTOMATED DIFF    Collection Time: 08/08/21  3:21 AM   Result Value Ref Range    WBC 11.0 4.3 - 11.1 K/uL    RBC 3.68 (L) 4.05 - 5.2 M/uL    HGB 10.5 (L) 11.7 - 15.4 g/dL    HCT 32.3 (L) 35.8 - 46.3 %    MCV 87.8 79.6 - 97.8 FL    MCH 28.5 26.1 - 32.9 PG    MCHC 32.5 31.4 - 35.0 g/dL    RDW 13.5 11.9 - 14.6 %    PLATELET 638 273 - 786 K/uL    MPV 8.5 (L) 9.4 - 12.3 FL    ABSOLUTE NRBC 0.00 0.0 - 0.2 K/uL    DF AUTOMATED      NEUTROPHILS 75 43 - 78 %    LYMPHOCYTES 14 13 - 44 %    MONOCYTES 9 4.0 - 12.0 %    EOSINOPHILS 1 0.5 - 7.8 %    BASOPHILS 0 0.0 - 2.0 %    IMMATURE GRANULOCYTES 1 0.0 - 5.0 %    ABS. NEUTROPHILS 8.2 1.7 - 8.2 K/UL    ABS. LYMPHOCYTES 1.6 0.5 - 4.6 K/UL    ABS. MONOCYTES 1.0 0.1 - 1.3 K/UL    ABS. EOSINOPHILS 0.1 0.0 - 0.8 K/UL    ABS. BASOPHILS 0.0 0.0 - 0.2 K/UL    ABS. IMM. GRANS. 0.1 0.0 - 0.5 K/UL       All Micro Results     Procedure Component Value Units Date/Time    CULTURE, URINE [[id number]] Collected: 08/07/21 2228    Order Status: No result     CULTURE, URINE [[id number]] Collected: 08/07/21 1645    Order Status: Canceled Specimen: Urine from Clean catch     CULTURE, URINE [[id number]]     Order Status: Canceled Specimen: Urine from Clean catch     C. DIFFICILE AG & TOXIN A/B [[id number]] Collected: 08/07/21 0947    Order Status: Completed Specimen: Stool Updated: 08/07/21 8159 [id number] ANTIGEN       C. DIFFICILE GDH ANTIGEN-NEGATIVE           C. difficile toxin       C. DIFFICILE TOXIN-NEGATIVE           PCR Reflex NOT APPLICABLE        INTERPRETATION       NEGATIVE FOR TOXIGENIC C. DIFFICILE           Clinical Consideration       NEGATIVE FOR TOXIGENIC C. DIFFICILE                Other Studies:  No results found.     Current Meds:  Current Facility-Administered Medications   Medication Dose Route Frequency    dextrose 5 % - 0.45% NaCl infusion  50 mL/hr IntraVENous CONTINUOUS    cefTRIAXone (ROCEPHIN) 1 g in 0.9% sodium chloride (MBP/ADV) 50 mL MBP  1 g IntraVENous Q24H    loperamide (IMODIUM) capsule 2 mg  2 mg Oral Q4H PRN    Saccharomyces boulardii (FLORASTOR) capsule 500 mg  500 mg Oral BID    melatonin tablet 5 mg  5 mg Oral QHS    temazepam (RESTORIL) capsule 15 mg  15 mg Oral QHS PRN    sodium chloride (NS) flush 5-40 mL  5-40 mL IntraVENous Q8H    sodium chloride (NS) flush 5-40 mL  5-40 mL IntraVENous PRN    acetaminophen (TYLENOL) tablet 650 mg  650 mg Oral Q6H PRN    Or    acetaminophen (TYLENOL) suppository 650 mg  650 mg Rectal Q6H PRN    polyethylene glycol (MIRALAX) packet 17 g  17 g Oral DAILY PRN    ondansetron (ZOFRAN ODT) tablet 4 mg  4 mg Oral Q8H PRN    Or    ondansetron (ZOFRAN) injection 4 mg  4 mg IntraVENous Q6H PRN       Signed:  Brooklyn Moody MD    Part of this note may have been written by using a voice dictation software. The note has been proof read but may still contain some grammatical/other typographical errors.

## 2021-08-08 NOTE — PROGRESS NOTES
ACUTE OT GOALS:  (Developed with and agreed upon by patient and/or caregiver.)  1. Patient will complete lower body bathing and dressing with SBA and adaptive equipment as needed. 2.Patient will complete upper body bathing and dressing with SBA and adaptive equipment as needed. 3. Patient will complete toileting with SBA. 4. Patient will tolerate 25 minutes of OT treatment with 1-2 rest breaks to increase activity tolerance for ADLs. 5. Patient will complete functional transfers with SBA and adaptive equipment as needed. 6. Patient will complete functional activity with SBA and adaptive equipment as needed. Timeframe: 7 visits     OCCUPATIONAL THERAPY ASSESSMENT: Initial Assessment and Daily Note OT Treatment Day # 1    Rigoberto King is a 78 y.o. female   PRIMARY DIAGNOSIS: Overdose  Overdose [T50.901A]       Reason for Referral:    ICD-10: Treatment Diagnosis: Generalized Muscle Weakness (M62.81)  Other lack of cordination (R27.8)  Difficulty in walking, Not elsewhere classified (R26.2)  [place]PAT[place]: [place] / [place] B / Product Type: Medicare /   ASSESSMENT:     REHAB RECOMMENDATIONS:   Recommendation to date pending progress:  Setting:   Short-term Rehab  Equipment:    To Be Determined     PRIOR LEVEL OF FUNCTION:  (Prior to Hospitalization)  INITIAL/CURRENT LEVEL OF FUNCTION:  (Based on today's evaluation)   Bathing:   Independent  Dressing:   Independent  Feeding/Grooming:   Independent  Toileting:   Independent  Functional Mobility:   Independent Bathing:   Maximal Assistance  Dressing:   Maximal Assistance  Feeding/Grooming:   Maximal Assistance  Toileting:   Maximal Assistance  Functional Mobility:   Maximal Assistance x 2     ASSESSMENT:  Ms. Frank Guzman presented to the hospital after an overdose. Patient with a history of dementia but able to complete ADLs and ambulation independently. Requires assist for IADLs from .  Today, pt was received supine in bed with B mitts on.  at bedside and able to provide PLOF informtation. Completed bed mobility maxA x2. Sit>stand maxA x2. Stand pivot transfer to chair maxA x2. MaxA for LB dressing. Pt presented with very poor initiation, sequencing, and motor planning which is significantly limiting functional mobility. Pt is also fearful with very poor command follow. Ms. Frank Guzman is currently functioning below functional baseline due to decreased strength, balance, coordination, activity tolerance, and cognition. Would benefit from skilled OT services at this time in order to address functional deficits and OT goals listed above. SUBJECTIVE:   Ms. Frank Guzman states, \"I need to lay down. \"    SOCIAL HISTORY/LIVING ENVIRONMENT: lives with her  in a one-level home, 3 steps to enter, walk-in shower with built in seat, independent for ADLS, assist for IADLs  Home Environment: Private residence  One/Two Story Residence: One story  Living Alone: No  Support Systems: Family member(s), Spouse/Significant Other/Partner    OBJECTIVE:     PAIN: VITAL SIGNS: LINES/DRAINS:   Pre Treatment: Pain Screen  Pain Scale 1: Numeric (0 - 10)  Pain Intensity 1: 0  Post Treatment: no change    IV  O2 Device: None (Room air)     GROSS EVALUATION:  BUEs Within Functional Limits Abnormal/ Functional Abnormal/ Non-Functional (see comments) Not Tested Comments:   AROM [x] [] [] []    PROM [x] [] [] []    Strength [x] [] [] []    Balance [] [x] [] [] Posterior lean    Posture [x] [] [] []    Sensation [] [] [] [x]    Coordination [] [x] [] [] Poor motor planning    Tone [] [] [] [x]    Edema [] [] [] [x]    Activity Tolerance [] [x] [] []     [] [] [] []      COGNITION/  PERCEPTION: Intact Impaired   (see comments) Comments:   Orientation [] [x] disoriented x4 but alert    Vision [x] []    Hearing [x] []    Judgment/ Insight [] [x]    Attention [] [x]    Memory [] [x] Baseline dementia    Command Following [] [x] Very poor command follow   Emotional Regulation [] [x] Very fearful     [] []      ACTIVITIES OF DAILY LIVING: I Mod I S SBA CGA Min Mod Max Total NT Comments   BASIC ADLs:              Bathing/ Showering [] [] [] [] [] [] [] [] [] [x]    Toileting [] [] [] [] [] [] [] [] [] [x]    Dressing [] [] [] [] [] [] [] [x] [] [] Donned socks    Feeding [] [] [] [] [] [] [] [] [] [x]    Grooming [] [] [] [] [] [] [] [] [] [x]    Personal Device Care [] [] [] [] [] [] [] [] [] [x]    Functional Mobility [] [] [] [] [] [] [] [x] [] [] x2    I=Independent, Mod I=Modified Independent, S=Supervision, SBA=Standby Assistance, CGA=Contact Guard Assistance,   Min=Minimal Assistance, Mod=Moderate Assistance, Max=Maximal Assistance, Total=Total Assistance, NT=Not Tested    MOBILITY: I Mod I S SBA CGA Min Mod Max Total  NT x2 Comments:   Supine to sit [] [] [] [] [] [] [] [x] [] [] [x]    Sit to supine [] [] [] [] [] [] [] [] [] [x] [] Left sitting in chair    Sit to stand [] [] [] [] [] [] [] [x] [] [] [x]    Bed to chair [] [] [] [] [] [] [] [x] [] [] [x]    I=Independent, Mod I=Modified Independent, S=Supervision, SBA=Standby Assistance, CGA=Contact Guard Assistance,   Min=Minimal Assistance, Mod=Moderate Assistance, Max=Maximal Assistance, Total=Total Assistance, NT=Not Tested    [address] [facility] [address] [id number] AM-PAC 6 Clicks   Daily Activity Inpatient Short Form        How much help from another person does the patient currently need. .. Total A Lot A Little None   1. Putting on and taking off regular lower body clothing? [] 1   [x] 2   [] 3   [] 4   2. Bathing (including washing, rinsing, drying)? [] 1   [x] 2   [] 3   [] 4   3. Toileting, which includes using toilet, bedpan or urinal?   [] 1   [x] 2   [] 3   [] 4   4. Putting on and taking off regular upper body clothing? [] 1   [x] 2   [] 3   [] 4   5. Taking care of personal grooming such as brushing teeth? [] 1   [x] 2   [] 3   [] 4   6. Eating meals?    [] 1   [] 2   [x] 3   [] 4   © 2007, Trustees of [address] [place] [id number], under license to [employer]. All rights reserved     Score:  Initial: 13 completed on 8/8/21 Most Recent: X (Date: -- )   Interpretation of Tool:  Represents activities that are increasingly more difficult (i.e. Bed mobility, Transfers, Gait). PLAN:   FREQUENCY/DURATION: OT Plan of Care: 3 times/week for duration of hospital stay or until stated goals are met, whichever comes first.    PROBLEM LIST:   (Skilled intervention is medically necessary to address:)  1. Decreased ADL/Functional Activities  2. Decreased Activity Tolerance  3. Decreased Balance  4. Decreased Cognition  5. Decreased Coordination  6. Decreased Transfer Abilities   INTERVENTIONS PLANNED:   (Benefits and precautions of occupational therapy have been discussed with the patient.)  1. Self Care Training  2. Therapeutic Activity  3. Therapeutic Exercise/HEP  4. Neuromuscular Re-education  5. Education     TREATMENT:     EVALUATION: Low Complexity : (Untimed Charge)    TREATMENT:   ( $$ Neuromuscular Re-Education: 8-22 mins   )  Co-Treatment PT/OT necessary due to patient's decreased overall endurance/tolerance levels, as well as need for high level skilled assistance to complete functional transfers/mobility and functional tasks  Neuromuscular Re-education (10 Minutes): Neuromuscular Re-education included Balance Training, Coordination training, Postural training, Sitting balance training and Standing balance training to improve Balance, Coordination, Functional Mobility and Postural Control.     TREATMENT GRID:  N/A    AFTER TREATMENT POSITION/PRECAUTIONS:  Alarm Activated, Chair, Needs within reach, RN notified and Visitors at bedside    INTERDISCIPLINARY COLLABORATION:  RN/PCT, PT/PTA and OT/HUANG    TOTAL TREATMENT DURATION:  OT Patient Time In/Time Out  Time In: 1421  Time Out: 528 Ben Lopez OT

## 2021-08-08 NOTE — PROGRESS NOTES
Date of Outreach Update:  Real Johnson was seen and assessed. MEWS Score: 2 (08/08/21 0321)  Vitals:    08/07/21 2057 08/07/21 2345 08/08/21 0321 08/08/21 0726   BP:  (!) 145/70 (!) 140/71 132/86   Pulse: (!) 118 (!) 109 (!) 106 (!) 117   Resp:  18 18 20   Temp:  98.8 °F (37.1 °C) 98.7 °F (37.1 °C) 97.9 °F (36.6 °C)   SpO2:  95% 95% 99%         Pain Assessment  Pain Intensity 1: 0 (08/07/21 2345)               Previous Outreach assessment has been reviewed. There have been no significant clinical changes since the completion of the last dated Outreach assessment. Will continue to follow up per outreach protocol.     Signed By:   Yonatan Kendrick    August 8, 2021 8:30 AM

## 2021-08-09 ENCOUNTER — HOME HEALTH ADMISSION (OUTPATIENT)
Dept: HOME HEALTH SERVICES | Facility: HOME HEALTH | Age: 71
End: 2021-08-09

## 2021-08-09 PROBLEM — E83.42 HYPOMAGNESEMIA: Status: ACTIVE | Noted: 2021-08-08

## 2021-08-09 PROCEDURE — 65270000029 HC RM PRIVATE

## 2021-08-09 PROCEDURE — 74011250637 HC RX REV CODE- 250/637: Performed by: INTERNAL MEDICINE

## 2021-08-09 PROCEDURE — 74011250637 HC RX REV CODE- 250/637: Performed by: FAMILY MEDICINE

## 2021-08-09 PROCEDURE — 74011000250 HC RX REV CODE- 250: Performed by: INTERNAL MEDICINE

## 2021-08-09 PROCEDURE — 74011250636 HC RX REV CODE- 250/636: Performed by: INTERNAL MEDICINE

## 2021-08-09 PROCEDURE — 74011000258 HC RX REV CODE- 258: Performed by: INTERNAL MEDICINE

## 2021-08-09 RX ORDER — MAGNESIUM SULFATE 1 G/100ML
1 INJECTION INTRAVENOUS ONCE
Status: COMPLETED | OUTPATIENT
Start: 2021-08-09 | End: 2021-08-09

## 2021-08-09 RX ADMIN — RDII 250 MG CAPSULE 500 MG: at 17:47

## 2021-08-09 RX ADMIN — RDII 250 MG CAPSULE 500 MG: at 08:46

## 2021-08-09 RX ADMIN — DEXTROSE MONOHYDRATE AND SODIUM CHLORIDE 50 ML/HR: 5; .45 INJECTION, SOLUTION INTRAVENOUS at 21:01

## 2021-08-09 RX ADMIN — Medication 5 MG: at 21:00

## 2021-08-09 RX ADMIN — POTASSIUM CHLORIDE 40 MEQ: 20 TABLET, EXTENDED RELEASE ORAL at 08:47

## 2021-08-09 RX ADMIN — CEFTRIAXONE 1 G: 1 INJECTION, POWDER, FOR SOLUTION INTRAMUSCULAR; INTRAVENOUS at 17:47

## 2021-08-09 RX ADMIN — Medication 10 ML: at 14:42

## 2021-08-09 RX ADMIN — MAGNESIUM SULFATE HEPTAHYDRATE 1 G: 1 INJECTION, SOLUTION INTRAVENOUS at 14:43

## 2021-08-09 RX ADMIN — Medication 10 ML: at 05:03

## 2021-08-09 RX ADMIN — DEXTROSE MONOHYDRATE AND SODIUM CHLORIDE 50 ML/HR: 5; .45 INJECTION, SOLUTION INTRAVENOUS at 01:35

## 2021-08-09 NOTE — PROGRESS NOTES
Problem: Patient Education: Go to Patient Education Activity  Goal: Patient/Family Education  Outcome: Progressing Towards Goal     Problem: Falls - Risk of  Goal: *Absence of Falls  Description: Document [place] Fall Risk and appropriate interventions in the flowsheet.   Outcome: Progressing Towards Goal  Note: Fall Risk Interventions:  Mobility Interventions: Bed/chair exit alarm    Mentation Interventions: Adequate sleep, hydration, pain control    Medication Interventions: Bed/chair exit alarm    Elimination Interventions: Bed/chair exit alarm

## 2021-08-09 NOTE — PROGRESS NOTES
Met with patient in room - ppe + social distance maintained. Patient's spouse, Juliana Sanchez, @ bedside and answered all assessment questions as patient has dementia and is unable to provide information. PCP confirmed - Dr. Susy Peraza. Patient's spouse stated that he assists patient 24/7. He stated that he also obtains assistance from his daughters and grandson. Patient has had home health services in the past but is not active with home health at this time. We discussed therapy's recommendation for patient to go to short term rehab at d/c. Patient's spouse feels that this will not be in her best interest due to her Dementia and feels that she will do better at home with home health. He stated that she is never alone and is afraid she will not do well in a facility where he cannot be there with her. He wishes to use [facility]  13.. I will send order and referral for this - PT/OT/RN. No DME reported - he stated that they have canes and walkers at home but that patient will not use any equipment. CM will continue to follow. Care Management Interventions  PCP Verified by CM:  Yes  Mode of Transport at Discharge: Self  Transition of Care Consult (CM Consult): [address]: Yes  Physical Therapy Consult: Yes  Occupational Therapy Consult: Yes  Current Support Network: Lives with Spouse  Confirm Follow Up Transport: Family  The Patient and/or Patient Representative was Provided with a Choice of Provider and Agrees with the Discharge Plan?: Yes  Freedom of Choice List was Provided with Basic Dialogue that Supports the Patient's Individualized Plan of Care/Goals, Treatment Preferences and Shares the Quality Data Associated with the Providers?: Yes   Resource Information Provided?: No  Discharge Location  Discharge Placement: [facility]

## 2021-08-09 NOTE — PROGRESS NOTES
END OF SHIFT NOTE:    INTAKE/OUTPUT  08/08 0701 - 08/09 0700  In: 1951.8 [P.O.:360; I.V.:1591.8]  Out: 700 [Urine:700]  Voiding: NO, hussein removed @ 1939 - not voided yet  Catheter: NO  Drain:              Flatus: Patient does have flatus present. Stool:  2 occurrences. Characteristics:  Stool Assessment  Stool Color: Brown  Stool Appearance: Loose  Stool Amount: Small  Stool Source/Status: Rectum    Emesis: 0 occurrences. Characteristics:        VITAL SIGNS  Patient Vitals for the past 12 hrs:   Temp Pulse Resp BP SpO2   08/09/21 1808 97.3 °F (36.3 °C) 84 18 113/79 93 %   08/09/21 1601 -- (!) 103 -- -- --   08/09/21 1528 97.4 °F (36.3 °C) 73 20 103/64 100 %   08/09/21 1111 98.4 °F (36.9 °C) 97 20 108/63 98 %   08/09/21 0745 98.1 °F (36.7 °C) 93 18 116/79 94 %       Pain Assessment  Pain Intensity 1: 0 (08/09/21 0755)        Patient Stated Pain Goal: 0    Ambulating  No    Shift report given to oncoming nurse at the bedside.     Charito Waterman RN

## 2021-08-09 NOTE — PROGRESS NOTES
Date of Outreach Update:  Darin Molina was seen and assessed. Previous Outreach assessment has been reviewed. There have been no significant clinical changes since the completion of the last dated Outreach assessment.     Will d/c from outreach program.    Signed By:   Lucas Crane RN    August 9, 2021 6:40 PM

## 2021-08-09 NOTE — PROGRESS NOTES
[facility] CRITICAL CARE OUTREACH NURSE PROGRESS REPORT      SUBJECTIVE: Called to assess patient secondary to ICU outreach protocol. MEWS Score: 1 (08/08/21 2243)  Vitals:    08/08/21 1137 08/08/21 1613 08/08/21 1811 08/08/21 2243   BP: 132/77 103/60 110/77 114/80   Pulse: (!) 109 (!) 120 (!) 114 83   Resp: 20 18  20   Temp: 98 °F (36.7 °C) 97.8 °F (36.6 °C) 97.4 °F (36.3 °C) 98.1 °F (36.7 °C)   SpO2: 94% 96% 96% 94%      EKG: unchanged from previous tracings, sinus tachycardia. LAB DATA:    Recent Labs     08/08/21 0321 08/07/21 0315 08/06/21  1431    143 142   K 3.1* 3.4* 3.4*   * 114* 107   CO2 24 22 32   AGAP 6* 7 3*   * 147* 122*   BUN 5* 14 15   CREA 0.56* 0.65 0.71   GFRAA >60 >60 >60   GFRNA >60 >60 >60   CA 8.5 8.0* 9.2   MG 1.7*  --  1.9   ALB  --   --  3.2   TP  --   --  7.0   GLOB  --   --  3.8*   AGRAT  --   --  0.8*   ALT  --   --  17        Recent Labs     08/08/21  0321 08/07/21  1610 08/07/21 0315 08/06/21  1431 08/06/21  1431   WBC 11.0  --  15.2*  --  10.3   HGB 10.5* 9.2* 8.8*   < > 11.9   HCT 32.3* 27.8* 26.7*   < > 37.4     --  303  --  399    < > = values in this interval not displayed. OBJECTIVE: On arrival to room, I found patient to be watching TV in bed with spouse at bedside. Spouse mentioned that the pt just received meletonin. Pain Assessment  Pain Intensity 1: 0 (08/08/21 2010)        Patient Stated Pain Goal: 0                                 ASSESSMENT:  Pt is a poor historian. Spouse answered most of the questions. Pt is on room air with oxygen saturation 96%. Blood pressure is stable. Heart rate is tachycardic due to course of problem. No active bleeding noted. PLAN:  Continue to monitor. Support primary nurse.

## 2021-08-09 NOTE — PROGRESS NOTES
Hospitalist Progress Note   Admit Date:  2021  2:28 PM   Name:  Darin Molina   Age:  78 y.o. Sex:  female  :  1950   MRN:  [id number]     Presenting Complaint: Drug Overdose    Reason(s) for Admission: Overdose [address] Course & Interval History:       Patient with past medical history of    Alzheimer's dementia    Patient was admitted initially for cilostazol overdose. She mistakenly took 60 tablets of cilostazole at home. She was hypotensive initially and admitted to ICU and was on Levophed. She is improved and levophed was stopped and she is moved out of ICU. She is found to have drop in Hb. No overt bleeding is noted. Subjective (21):    21   Patient is resting in bed. No talking. She opens eyes with verbal stimulation. No fever. No shaking. No chills. No new complaints. 2021  Patient seen and evaluated. She is confused but pleasant and happy. Afebrile overnight.  at bedside and states that she is appearing clinically improved to him and is closer to her baseline. No complaints of nausea or vomiting or shortness of breath or chest pain.     Assessment & Plan:   MDM  Number of Diagnoses or Management Options  Accidental overdose, initial encounter: established, improving  Hypotension due to drugs: established, improving  Diagnosis management comments: Hospital Problems as of 2021 Date Reviewed: 2016          Codes Class Noted - Resolved POA    Hypotension ICD-10-CM: I95.9  ICD-9-CM: 458.9  2021 - Present Yes        Hypomagnesemia ICD-10-CM: E83.42  ICD-9-CM: 275.2  2021 - Present Clinically Undetermined        Anemia ICD-10-CM: D64.9  ICD-9-CM: 285.9  2021 - Present Unknown        Hypoxia ICD-10-CM: R09.02  ICD-9-CM: 799.02  2021 - Present Unknown        Acute blood loss anemia ICD-10-CM: D62  ICD-9-CM: 285.1  2021 - Present No        Hypokalemia ICD-10-CM: E87.6  ICD-9-CM: 276.8  2021 - Present Yes        UTI (urinary tract infection) ICD-10-CM: N39.0  ICD-9-CM: 599.0  8/7/2021 - Present Yes        Diarrhea ICD-10-CM: R19.7  ICD-9-CM: 787.91  8/7/2021 - Present Yes        Hyperglycemia ICD-10-CM: R73.9  ICD-9-CM: 790.29  8/7/2021 - Present Yes        * (Principal) Overdose ICD-10-CM: T50.901A  ICD-9-CM: 977.9, E980.5  8/6/2021 - Present Unknown        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   ICD-10-CM: G30.0, F02.81  ICD-9-CM: 331.0, 294.11  8/6/2021 - Present Unknown               Amount and/or Complexity of Data Reviewed  Clinical lab tests: reviewed  Tests in the radiology section of CPT®: reviewed  Tests in the medicine section of CPT®: reviewed  Decide to obtain previous medical records or to obtain history from someone other than the patient: yes  Obtain history from someone other than the patient: yes  Review and summarize past medical records: yes  Discuss the patient with other providers: yes  Independent visualization of images, tracings, or specimens: yes    Risk of Complications, Morbidity, and/or Mortality  Presenting problems: high  Diagnostic procedures: moderate  Management options: moderate    Patient Progress  Patient progress: improved        Principal Problem:    Overdose (8/6/2021)    Accidental overdose. BP is improved and holding up. No vasopressor needed. Persistent tachycardia, although better. EKG shows sinus tachycardia. Monitor closely. 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 Patient is resting calmly.      August 9, 2021  -Continue supportive care  -Patient requires close monitoring  -Requires mitts to avoid her from interfering with her medical care      Anemia (8/7/2021)  Hb seems holding up   No active bleeding   Monitor     August 9, 2021  -Hemoglobin currently stable   -Actually trending up  -We will continue to monitor      Hypokalemia (8/7/2021)    Without SHEELA   Will provide K supplementation  Give KCl 40 mEq po daily. August 9, 2021  -Continue current medical management  -Recheck labs in the a.m.      UTI (urinary tract infection) (8/7/2021)  Patient could not tell symptoms   She is started on Rocephin on 8/7/2021. Will continue and follow up on culture result. August 9, 2021  -Continue Rocephin  -Culture results are pending currently no growth x24 hours      Diarrhea (8/7/2021)  Monitor and symptomatic treatment. On Florastor. Check C. Difficile if continues to have diarrhea. August 9, 2021  -Appears resolved  -No episodes per nursing    Hyperglycemia (8/7/2021)  BS has been in low 100's ranges which is acceptable now. Will monitor. August 9, 2021  -Blood sugar remained in the 100s  -Remains acceptable  -Continue to monitor      Hypotension (8/7/2021)  From overdose. Resolved and stable now. Monitor. August 9, 2021  -Resolved    Hypomagnesemia  -We will replete      Dispo/Discharge Planning: Likely in the next 24 hours pending medical stability and culture results  Will have physical therapy work on patient.      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 ([facility] 08/06/2021       Objective:     Patient Vitals for the past 24 hrs:   Temp Pulse Resp BP SpO2   08/09/21 0745 98.1 °F (36.7 °C) 93 18 116/79 94 %   08/09/21 0244 98.7 °F (37.1 °C) 94 18 128/88 95 %   08/08/21 2243 98.1 °F (36.7 °C) 83 20 114/80 94 %   08/08/21 1811 97.4 °F (36.3 °C) (!) 114 -- 110/77 96 %   08/08/21 1613 97.8 °F (36.6 °C) (!) 120 18 103/60 96 %   08/08/21 1137 98 °F (36.7 °C) (!) 109 20 132/77 94 %     Oxygen Therapy  O2 Sat (%): 94 % (08/09/21 0745)  Pulse via Oximetry: 113 beats per minute (08/07/21 1401)  O2 Device: None (Room air) (08/08/21 1421)    Estimated body mass index is 19.09 kg/m² as calculated from the following:    Height as of 11/16/16: 5' 2.5\" (1.588 m). Weight as of 11/16/16: 48.1 kg (106 lb 1 oz). Intake/Output Summary (Last 24 hours) at 8/9/2021 0824  Last data filed at 8/9/2021 0250  Gross per 24 hour   Intake 1123.82 ml   Output 700 ml   Net 423.82 ml         Physical Exam:     General:    Well nourished. No overt distress. She is in bed and quite interactive but nonverbal with me. She does discuss getting up to go to the bathroom with her . Afebrile. Head:  Normocephalic, atraumatic  Eyes:  Sclerae appear normal.  Pupils equally round. HENT:  Nares appear normal, no drainage. Moist mucous membranes  Neck:  No restricted ROM. Trachea midline; harsh upper airway sounds that appear to be secondary to patient's own self inducement. CV:   RRR. No m/r/g. No JVD  Lungs:   CTAB. No wheezing, rhonchi, or rales. Appears even, unlabored transmitted upper airway sounds  Abdomen: Bowel sounds present. Soft, nontender, nondistended. Extremities: Warm and dry. No cyanosis or clubbing. No edema. Skin:     No rashes. Normal turgor. Normal coloration  Neuro:  Cranial nerves II-XII grossly intact. No observed motor weakness. Data Ordered and Personally Reviewed:    Last 24hr Labs:  Recent Results (from the past 24 hour(s))   CULTURE, URINE    Collection Time: 08/08/21  6:15 PM    Specimen: Urine    CATH URINE   Result Value Ref Range    Special Requests: NO SPECIAL REQUESTS      Culture result:        NO GROWTH AFTER SHORT PERIOD OF INCUBATION.  FURTHER RESULTS TO FOLLOW AFTER OVERNIGHT INCUBATION. All Micro Results     Procedure Component Value Units Date/Time    CULTURE, URINE [[id number]] Collected: 08/08/21 1815    Order Status: Completed Specimen: Urine Updated: 08/09/21 0804     Special Requests: NO SPECIAL REQUESTS        Culture result:       NO GROWTH AFTER SHORT PERIOD OF INCUBATION. FURTHER RESULTS TO FOLLOW AFTER OVERNIGHT INCUBATION. CULTURE, URINE [[id number]] Collected: 08/07/21 1645    Order Status: Canceled Specimen: Urine from Clean catch     CULTURE, URINE [[id number]]     Order Status: Canceled Specimen: Urine from Clean catch     C. DIFFICILE AG & TOXIN A/B [[id number]] Collected: 08/07/21 0947    Order Status: Completed Specimen: Stool Updated: 08/07/21 1230     [address] ANTIGEN       C. DIFFICILE [facility] ANTIGEN-NEGATIVE           C. difficile toxin       C. DIFFICILE TOXIN-NEGATIVE           PCR Reflex NOT APPLICABLE        INTERPRETATION       NEGATIVE FOR TOXIGENIC C. DIFFICILE           Clinical Consideration       NEGATIVE FOR TOXIGENIC C. DIFFICILE                Other Studies:  No results found.     Current Meds:  Current Facility-Administered Medications   Medication Dose Route Frequency    potassium chloride (K-DUR, KLOR-CON) SR tablet 40 mEq  40 mEq Oral DAILY    dextrose 5 % - 0.45% NaCl infusion  50 mL/hr IntraVENous CONTINUOUS    cefTRIAXone (ROCEPHIN) 1 g in 0.9% sodium chloride (MBP/ADV) 50 mL MBP  1 g IntraVENous Q24H    loperamide (IMODIUM) capsule 2 mg  2 mg Oral Q4H PRN    Saccharomyces boulardii (FLORASTOR) capsule 500 mg  500 mg Oral BID    melatonin tablet 5 mg  5 mg Oral QHS    temazepam (RESTORIL) capsule 15 mg  15 mg Oral QHS PRN    sodium chloride (NS) flush 5-40 mL  5-40 mL IntraVENous Q8H    sodium chloride (NS) flush 5-40 mL  5-40 mL IntraVENous PRN    acetaminophen (TYLENOL) tablet 650 mg  650 mg Oral Q6H PRN    Or    acetaminophen (TYLENOL) suppository 650 mg  650 mg Rectal Q6H PRN    polyethylene glycol (MIRALAX) packet 17 g  17 g Oral DAILY PRN    ondansetron (ZOFRAN ODT) tablet 4 mg  4 mg Oral Q8H PRN    Or    ondansetron (ZOFRAN) injection 4 mg  4 mg IntraVENous Q6H PRN       Signed:  Priyanka Preez MD    Part of this note may have been written by using a voice dictation software. The note has been proof read but may still contain some grammatical/other typographical errors.

## 2021-08-09 NOTE — PROGRESS NOTES
Problem: Pressure Injury - Risk of  Goal: *Prevention of pressure injury  Description: Document Bob Scale and appropriate interventions in the flowsheet. Outcome: Progressing Towards Goal  Note: Pressure Injury Interventions:  Sensory Interventions: Assess changes in LOC, Check visual cues for pain, Maintain/enhance activity level    Moisture Interventions: Absorbent underpads, Internal/External urinary devices    Activity Interventions: Pressure redistribution bed/mattress(bed type), Assess need for specialty bed    Mobility Interventions: Pressure redistribution bed/mattress (bed type)    Nutrition Interventions: Document food/fluid/supplement intake, Offer support with meals,snacks and hydration    Friction and Shear Interventions: Lift sheet, Minimize layers                Problem: Patient Education: Go to Patient Education Activity  Goal: Patient/Family Education  Outcome: Progressing Towards Goal     Problem: Falls - Risk of  Goal: *Absence of Falls  Description: Document Vivek Fall Risk and appropriate interventions in the flowsheet.   Outcome: Progressing Towards Goal  Note: Fall Risk Interventions:  Mobility Interventions: Communicate number of staff needed for ambulation/transfer, Patient to call before getting OOB    Mentation Interventions: Door open when patient unattended, Reorient patient, Update white board    Medication Interventions: Patient to call before getting OOB, Teach patient to arise slowly    Elimination Interventions: Call light in reach, Patient to call for help with toileting needs              Problem: Patient Education: Go to Patient Education Activity  Goal: Patient/Family Education  Outcome: Progressing Towards Goal     Problem: General Medical Care Plan  Goal: *Vital signs within specified parameters  Outcome: Progressing Towards Goal  Goal: *Labs within defined limits  Outcome: Progressing Towards Goal  Goal: *Absence of infection signs and symptoms  Outcome: Progressing Towards Goal  Goal: *Optimal pain control at patient's stated goal  Outcome: Progressing Towards Goal  Goal: *Skin integrity maintained  Outcome: Progressing Towards Goal  Goal: *Fluid volume balance  Outcome: Progressing Towards Goal  Goal: *Optimize nutritional status  Outcome: Progressing Towards Goal  Goal: *Anxiety reduced or absent  Outcome: Progressing Towards Goal  Goal: *Progressive mobility and function (eg: ADL's)  Outcome: Progressing Towards Goal     Problem: Patient Education: Go to Patient Education Activity  Goal: Patient/Family Education  Outcome: Progressing Towards Goal     Problem: Patient Education: Go to Patient Education Activity  Goal: Patient/Family Education  Outcome: Progressing Towards Goal     Problem: Patient Education: Go to Patient Education Activity  Goal: Patient/Family Education  Outcome: Progressing Towards Goal

## 2021-08-09 NOTE — PROGRESS NOTES
Critical Care Outreach Nurse Progress Report:    Subjective: In to assess pt secondary to f/u icu transfer  MEWS Score: 1 (08/09/21 1528)    Vitals:    08/09/21 0745 08/09/21 1111 08/09/21 1528 08/09/21 1601   BP: 116/79 108/63 103/64    Pulse: 93 97 73 (!) 103   Resp: 18 20 20    Temp: 98.1 °F (36.7 °C) 98.4 °F (36.9 °C) 97.4 °F (36.3 °C)    SpO2: 94% 98% 100%         Objective: Pt lying calmly in bed,  at bedside. Pain Intensity 1: 0 (08/09/21 0755)        Patient Stated Pain Goal: 0    Assessment: Alert, follows some commands. Hx Alzheimer's. On RA, O2 Sat 98%. BP stable. Denies pain. Plan: Will follow per outreach protocol.

## 2021-08-09 NOTE — PROGRESS NOTES
[facility] CRITICAL CARE OUTREACH NURSE PROGRESS REPORT      SUBJECTIVE: Called to assess patient secondary to ICU outreach protocol. MEWS Score: 1 (08/09/21 0244)  Vitals:    08/08/21 1613 08/08/21 1811 08/08/21 2243 08/09/21 0244   BP: 103/60 110/77 114/80 128/88   Pulse: (!) 120 (!) 114 83 94   Resp: 18  20 18   Temp: 97.8 °F (36.6 °C) 97.4 °F (36.3 °C) 98.1 °F (36.7 °C) 98.7 °F (37.1 °C)   SpO2: 96% 96% 94% 95%      EKG: normal EKG, normal sinus rhythm, unchanged from previous tracings. LAB DATA:    Recent Labs     08/08/21 0321 08/07/21 0315 08/06/21  1431    143 142   K 3.1* 3.4* 3.4*   * 114* 107   CO2 24 22 32   AGAP 6* 7 3*   * 147* 122*   BUN 5* 14 15   CREA 0.56* 0.65 0.71   GFRAA >60 >60 >60   GFRNA >60 >60 >60   CA 8.5 8.0* 9.2   MG 1.7*  --  1.9   ALB  --   --  3.2   TP  --   --  7.0   GLOB  --   --  3.8*   AGRAT  --   --  0.8*   ALT  --   --  17        Recent Labs     08/08/21  0321 08/07/21  1610 08/07/21 0315 08/06/21  1431 08/06/21  1431   WBC 11.0  --  15.2*  --  10.3   HGB 10.5* 9.2* 8.8*   < > 11.9   HCT 32.3* 27.8* 26.7*   < > 37.4     --  303  --  399    < > = values in this interval not displayed. OBJECTIVE: On arrival to room, I found patient to be resting quitely in bed; visitor not present. Pain Assessment  Pain Intensity 1: 0 (08/08/21 2010)        Patient Stated Pain Goal: 0                                 ASSESSMENT:  Pt vitals are stable. Breathing is unlabored and chest rise is symmetrical. HR is in sinus rhythm and BP is within range. PLAN:  Continue to monitor via ICU outreach protocol. Support primary nurse.

## 2021-08-10 LAB
ANION GAP SERPL CALC-SCNC: 8 MMOL/L (ref 7–16)
BUN SERPL-MCNC: 4 MG/DL (ref 8–23)
CALCIUM SERPL-MCNC: 8.5 MG/DL (ref 8.3–10.4)
CHLORIDE SERPL-SCNC: 108 MMOL/L (ref 98–107)
CO2 SERPL-SCNC: 27 MMOL/L (ref 21–32)
CREAT SERPL-MCNC: 0.5 MG/DL (ref 0.6–1)
GLUCOSE SERPL-MCNC: 96 MG/DL (ref 65–100)
MAGNESIUM SERPL-MCNC: 2 MG/DL (ref 1.8–2.4)
POTASSIUM SERPL-SCNC: 2.5 MMOL/L (ref 3.5–5.1)
POTASSIUM SERPL-SCNC: 3.9 MMOL/L (ref 3.5–5.1)
SODIUM SERPL-SCNC: 143 MMOL/L (ref 136–145)

## 2021-08-10 PROCEDURE — 74011250636 HC RX REV CODE- 250/636: Performed by: INTERNAL MEDICINE

## 2021-08-10 PROCEDURE — 84132 ASSAY OF SERUM POTASSIUM: CPT

## 2021-08-10 PROCEDURE — 80048 BASIC METABOLIC PNL TOTAL CA: CPT

## 2021-08-10 PROCEDURE — 74011250637 HC RX REV CODE- 250/637: Performed by: INTERNAL MEDICINE

## 2021-08-10 PROCEDURE — 36415 COLL VENOUS BLD VENIPUNCTURE: CPT

## 2021-08-10 PROCEDURE — 83735 ASSAY OF MAGNESIUM: CPT

## 2021-08-10 PROCEDURE — 65270000029 HC RM PRIVATE

## 2021-08-10 PROCEDURE — 74011250637 HC RX REV CODE- 250/637: Performed by: FAMILY MEDICINE

## 2021-08-10 RX ORDER — ESCITALOPRAM OXALATE 10 MG/1
10 TABLET ORAL DAILY
Status: DISCONTINUED | OUTPATIENT
Start: 2021-08-11 | End: 2021-08-11 | Stop reason: HOSPADM

## 2021-08-10 RX ORDER — POTASSIUM CHLORIDE 14.9 MG/ML
20 INJECTION INTRAVENOUS ONCE
Status: COMPLETED | OUTPATIENT
Start: 2021-08-10 | End: 2021-08-10

## 2021-08-10 RX ORDER — POTASSIUM CHLORIDE 20 MEQ/1
40 TABLET, EXTENDED RELEASE ORAL 2 TIMES DAILY
Status: DISCONTINUED | OUTPATIENT
Start: 2021-08-10 | End: 2021-08-11 | Stop reason: HOSPADM

## 2021-08-10 RX ADMIN — RDII 250 MG CAPSULE 500 MG: at 17:15

## 2021-08-10 RX ADMIN — POTASSIUM CHLORIDE 20 MEQ: 14.9 INJECTION, SOLUTION INTRAVENOUS at 09:11

## 2021-08-10 RX ADMIN — RDII 250 MG CAPSULE 500 MG: at 09:10

## 2021-08-10 RX ADMIN — Medication 5 MG: at 21:07

## 2021-08-10 RX ADMIN — POTASSIUM CHLORIDE 40 MEQ: 20 TABLET, EXTENDED RELEASE ORAL at 17:15

## 2021-08-10 RX ADMIN — POTASSIUM CHLORIDE 40 MEQ: 20 TABLET, EXTENDED RELEASE ORAL at 09:10

## 2021-08-10 RX ADMIN — Medication 10 ML: at 22:00

## 2021-08-10 RX ADMIN — Medication 10 ML: at 14:11

## 2021-08-10 NOTE — PROGRESS NOTES
Patient received in bed restless, oriented x1 (to self) only. Patient reoriented to room/ surroundings. Bilateral safety mitts in use. No s/s distress/discomfort noted. Shift assessment completed. Patient's  at bedside. Bed in low position with side rails x4 up and call bell in place for safety will monitor.

## 2021-08-10 NOTE — PROGRESS NOTES
Physician Progress Note      PATIENTRuara Shell  CSN #:                  [id number]  :                       1950  ADMIT DATE:       2021 2:28 PM  [address] DATE:  RESPONDING  PROVIDER #:        Gema Ventura MD          QUERY TEXT:    Pt admitted with cilostazol overdose. Pt noted to have hypotension requiring Levophed infusion and ICU. If possible, please document in the progress notes and discharge summary if you are evaluating and/or treating any of the following: The medical record reflects the following:  Risk Factors: 78 yr, hx Dementia    Clinical Indicators: hypotension 58/38,76/74,80/50, Sinus tachycardia with a rate 120, 123, per documentation on the history and physical Cilostazol OD is a rare occurrence and associated with vasodilation induced hypotension cardiac arrhythmias and bleeding. per the physical exam on history and physical EXT warm without cyanosis    Treatment:  Levophed infusion, ICU, continuous IVF  Options provided:  -- Cardiogenic Shock  -- Hypovolemic Shock  -- Hypovolemia without Shock  -- Hypotension without Shock  -- Other - I will add my own diagnosis  -- Disagree - Not applicable / Not valid  -- Disagree - Clinically unable to determine / Unknown  -- Refer to Clinical Documentation Reviewer    PROVIDER RESPONSE TEXT:    This patient has Hypovolemic Shock.     Query created by: Chris Dawkins on 8/10/2021 8:21 AM      Electronically signed by:  Gema Ventura MD 8/10/2021 12:41 PM

## 2021-08-10 NOTE — PROGRESS NOTES
Hospitalist Progress Note   Admit Date:  2021  2:28 PM   Name:  Candace Jose   Age:  78 y.o. Sex:  female  :  1950   MRN:  [id number]     Presenting Complaint: Drug Overdose    Reason(s) for Admission: Overdose [address] Course & Interval History:       Patient with past medical history of    Alzheimer's dementia    Patient was admitted initially for cilostazol overdose. She mistakenly took 60 tablets of cilostazole at home. She was hypotensive initially and admitted to ICU and was on Levophed. She is improved and levophed was stopped and she is moved out of ICU. She is found to have drop in Hb. No overt bleeding is noted. Subjective (08/10/21):    21   Patient is resting in bed. No talking. She opens eyes with verbal stimulation. No fever. No shaking. No chills. No new complaints. 2021  Patient seen and evaluated. She is confused but pleasant and happy. Afebrile overnight.  at bedside and states that she is appearing clinically improved to him and is closer to her baseline. No complaints of nausea or vomiting or shortness of breath or chest pain. August 10, 2021 -patient seen and evaluated. Confused and pleasant and happy. Afebrile overnight. Daughter at bedside all questions answered. No complaints of chest pain, shortness of breath chills nausea or vomiting or diarrhea.   No adverse overnight events reported by staff    Assessment & Plan:   MDM  Number of Diagnoses or Management Options  Accidental overdose, initial encounter: established, improving  Hypotension due to drugs: established, improving  Diagnosis management comments: Hospital Problems as of 8/10/2021    Hypotension ICD-10-CM: I95.9  ICD-9-CM: 458.9  2021 -resolved       Hypomagnesemia ICD-10-CM: E83.42  ICD-9-CM: 275.2  2021 -resolved       Anemia ICD-10-CM: D64.9  ICD-9-CM: 285.9  2021 -stable       Hypoxia ICD-10-CM: R09.02  ICD-9-CM: 799.02 8/7/2021 -resolved       Acute blood loss anemia ICD-10-CM: Resolved       Hypokalemia ICD-10-CM: E87.6  ICD-9-CM: 276.8  8/7/2021 - Present Yes        UTI (urinary tract infection) ICD-10-CM: N39.0  ICD-9-CM: 599.0  8/7/2021 - Present Yes        Diarrhea ICD-10-CM: R19.7  ICD-9-CM: 787.91  8/7/2021 -resolved       Hyperglycemia ICD-10-CM: R73.9  ICD-9-CM: 790.29  8/7/2021 -stable       * (Principal) Overdose ICD-10-CM: T50.901A  ICD-9-CM: 977.9, E980.5  8/6/2021 -accidental and resolved       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 BP is improved and holding up. No vasopressor needed. Persistent tachycardia, although better. EKG shows sinus tachycardia. Monitor closely. 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 Patient is resting calmly. August 10, 2021  -Continue supportive care  -Patient requires close monitoring  -Requires mitts to avoid her from interfering with her medical care      Anemia (8/7/2021)  Hb seems holding up   No active bleeding   Monitor     August 10, 2021  -Hemoglobin currently stable         Hypokalemia (8/7/2021)    Without SHEELA   Will provide K supplementation  Give KCl 40 mEq po daily. August 9, 2021  -Continue current medical management  -Recheck labs in the a.m.     August 10, 2021  -As she trending down  -Placed on remote telemetry  -Replacing  -Recheck labs this evening and tomorrow a.m.      UTI (urinary tract infection) (8/7/2021)  Patient could not tell symptoms   She is started on Rocephin on 8/7/2021. Will continue and follow up on culture result. August 9, 2021  -Status post Rocephin  -Culture results are no growth to date      Diarrhea (8/7/2021)  Monitor and symptomatic treatment. On Florastor. Check C. Difficile if continues to have diarrhea. August 10, 2021  -Remains resolved  -No episodes per nursing    Hyperglycemia (8/7/2021)  Resolved      Hypotension (8/7/2021)  From overdose. Resolved and stable now. Monitor.      August 10, 2021  -Resolved    Hypomagnesemia  August 10, 2021  -Resolved    Dispo/Discharge Planning: Likely home in the next 24 hours once potassium corrected   diet:  ADULT DIET Dysphagia - Soft & Bite Sized  DVT PPx: SCD, avoid anticoagulation in light of possible bleeding and change in Hb (though no evidence of active bleeding)   Code status: DNR    Active Hospital Problems    Diagnosis Date Noted    Hypotension 08/07/2021     Priority: 1 - One    Hypomagnesemia 08/08/2021    Anemia 08/07/2021    Hypoxia 08/07/2021    Acute blood loss anemia 08/07/2021    Hypokalemia 08/07/2021    UTI (urinary tract infection) 08/07/2021    Diarrhea 08/07/2021    Hyperglycemia 08/07/2021    Overdose 08/06/2021    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 ([facility] [address] 08/06/2021       Objective:     Patient Vitals for the past 24 hrs:   Temp Pulse Resp BP SpO2   08/10/21 0717 98.6 °F (37 °C) 84 18 113/71 91 %   08/10/21 0342 98.7 °F (37.1 °C) 90 17 106/63 93 %   08/09/21 2226 97.4 °F (36.3 °C) 88 17 124/82 94 %   08/09/21 2114 97.3 °F (36.3 °C) 91 18 118/70 93 %   08/09/21 1808 97.3 °F (36.3 °C) 84 18 113/79 93 %   08/09/21 1601 -- (!) 103 -- -- --   08/09/21 1528 97.4 °F (36.3 °C) 73 20 103/64 100 %   08/09/21 1111 98.4 °F (36.9 °C) 97 20 108/63 98 %     Oxygen Therapy  O2 Sat (%): 91 % (08/10/21 0717)  Pulse via Oximetry: 113 beats per minute (08/07/21 1401)  O2 Device: None (Room air) (08/09/21 2114)    Estimated body mass index is 19.09 kg/m² as calculated from the following:    Height as of 11/16/16: 5' 2.5\" (1.588 m). Weight as of 11/16/16: 48.1 kg (106 lb 1 oz). Intake/Output Summary (Last 24 hours) at 8/10/2021 0833  Last data filed at 8/10/2021 0533  Gross per 24 hour   Intake 1823 ml   Output 500 ml   Net 1323 ml         Physical Exam:     General:    Well nourished. No overt distress. She is in bed and quite interactive but nonverbal with me. She does discuss getting up to go to the bathroom with her . Afebrile. Head:  Normocephalic, atraumatic  Eyes:  Sclerae appear normal.  Pupils equally round. HENT:  Nares appear normal, no drainage. Moist mucous membranes  Neck:  No restricted ROM. Trachea midline; harsh upper airway sounds that appear to be secondary to patient's own self inducement. CV:   RRR. No m/r/g. No JVD  Lungs:   CTAB. No wheezing, rhonchi, or rales. Appears even, unlabored transmitted upper airway sounds  Abdomen: Bowel sounds present. Soft, nontender, nondistended. Extremities: Warm and dry. No cyanosis or clubbing. No edema. Skin:     No rashes. Normal turgor. Normal coloration  Neuro:  Cranial nerves II-XII grossly intact. No observed motor weakness.      Data Ordered and Personally Reviewed:    Last 24hr Labs:  Recent Results (from the past 24 hour(s))   METABOLIC PANEL, BASIC    Collection Time: 08/10/21  5:04 AM   Result Value Ref Range    Sodium 143 136 - 145 mmol/L    Potassium 2.5 (L) 3.5 - 5.1 mmol/L    Chloride 108 (H) 98 - 107 mmol/L    CO2 27 21 - 32 mmol/L    Anion gap 8 7 - 16 mmol/L    Glucose 96 65 - 100 mg/dL    BUN 4 (L) 8 - 23 MG/DL    Creatinine 0.50 (L) 0.6 - 1.0 MG/DL    GFR est AA >60 >60 ml/min/1.73m2    GFR est non-AA >60 >60 ml/min/1.73m2    Calcium 8.5 8.3 - 10.4 MG/DL   MAGNESIUM    Collection Time: 08/10/21  5:04 AM   Result Value Ref Range    Magnesium 2.0 1.8 - 2.4 mg/dL       All Micro Results     Procedure Component Value Units Date/Time    CULTURE, URINE [[id number]] Collected: 08/08/21 1815    Order Status: Completed Specimen: Urine Updated: 08/10/21 0732     Special Requests: NO SPECIAL REQUESTS        Culture result: NO GROWTH 1 DAY       CULTURE, URINE [[id number]] Collected: 08/07/21 1645    Order Status: Canceled Specimen: Urine from Clean catch     CULTURE, URINE [[id number]]     Order Status: Canceled Specimen: Urine from Clean catch     C. DIFFICILE AG & TOXIN A/B [[id number]] Collected: 08/07/21 0947    Order Status: Completed Specimen: Stool Updated: 08/07/21 1234     [address] [place] ANTIGEN       C. DIFFICILE [facility] ANTIGEN-NEGATIVE           C. difficile toxin       C. DIFFICILE TOXIN-NEGATIVE           PCR Reflex NOT APPLICABLE        INTERPRETATION       NEGATIVE FOR TOXIGENIC C. DIFFICILE           Clinical Consideration       NEGATIVE FOR TOXIGENIC C. DIFFICILE                Other Studies:  No results found.     Current Meds:  Current Facility-Administered Medications   Medication Dose Route Frequency    potassium chloride 20 mEq in 100 ml IVPB  20 mEq IntraVENous ONCE    potassium chloride (K-DUR, KLOR-CON) SR tablet 40 mEq  40 mEq Oral BID    dextrose 5 % - 0.45% NaCl infusion  50 mL/hr IntraVENous CONTINUOUS    loperamide (IMODIUM) capsule 2 mg  2 mg Oral Q4H PRN    Saccharomyces boulardii (FLORASTOR) capsule 500 mg  500 mg Oral BID    melatonin tablet 5 mg  5 mg Oral QHS    temazepam (RESTORIL) capsule 15 mg  15 mg Oral QHS PRN    sodium chloride (NS) flush 5-40 mL  5-40 mL IntraVENous Q8H    sodium chloride (NS) flush 5-40 mL  5-40 mL IntraVENous PRN    acetaminophen (TYLENOL) tablet 650 mg  650 mg Oral Q6H PRN    Or    acetaminophen (TYLENOL) suppository 650 mg  650 mg Rectal Q6H PRN    polyethylene glycol (MIRALAX) packet 17 g  17 g Oral DAILY PRN    ondansetron (ZOFRAN ODT) tablet 4 mg  4 mg Oral Q8H PRN    Or    ondansetron (ZOFRAN) injection 4 mg  4 mg IntraVENous Q6H PRN       Signed:  Porsche Alaniz MD    Part of this note may have been written by using a voice dictation software. The note has been proof read but may still contain some grammatical/other typographical errors.

## 2021-08-10 NOTE — PROGRESS NOTES
Pt's IV infiltrated at this time and had to be removed. ICU rover nurse called and asked to place one d/t pt being a very hard stick. Will pass along to night shift nurse.

## 2021-08-10 NOTE — PROGRESS NOTES
END OF SHIFT NOTE:    INTAKE/OUTPUT  08/09 0701 - 08/10 0700  In: 2631 [P.O.:480; I.V.:1343]  Out: 500 [Urine:500]  Voiding: YES  Catheter: NO  Drain:              Flatus: Patient does have flatus present. Stool:  0 occurrences. Characteristics:  Stool Assessment  Stool Color: Brown  Stool Appearance: Loose  Stool Amount: Small  Stool Source/Status: Rectum    Emesis: 0 occurrences. Characteristics:        VITAL SIGNS  Patient Vitals for the past 12 hrs:   Temp Pulse Resp BP SpO2   08/10/21 1443 97.2 °F (36.2 °C) 91 18 105/71 93 %   08/10/21 1115 98.1 °F (36.7 °C) 93 18 110/74 94 %   08/10/21 0717 98.6 °F (37 °C) 84 18 113/71 91 %       Pain Assessment  Pain Intensity 1: 0 (08/10/21 0734)        Patient Stated Pain Goal: Unable to verbalize/indicate pain    Ambulating  No    Shift report given to oncoming nurse at the bedside.     Beau Osuna RN

## 2021-08-11 VITALS
DIASTOLIC BLOOD PRESSURE: 89 MMHG | WEIGHT: 108.91 LBS | RESPIRATION RATE: 18 BRPM | BODY MASS INDEX: 19.6 KG/M2 | SYSTOLIC BLOOD PRESSURE: 141 MMHG | OXYGEN SATURATION: 96 % | HEART RATE: 86 BPM | TEMPERATURE: 97.3 F

## 2021-08-11 LAB
ANION GAP SERPL CALC-SCNC: 6 MMOL/L (ref 7–16)
BACTERIA SPEC CULT: NORMAL
BUN SERPL-MCNC: 4 MG/DL (ref 8–23)
CALCIUM SERPL-MCNC: 8.7 MG/DL (ref 8.3–10.4)
CHLORIDE SERPL-SCNC: 111 MMOL/L (ref 98–107)
CO2 SERPL-SCNC: 27 MMOL/L (ref 21–32)
CREAT SERPL-MCNC: 0.48 MG/DL (ref 0.6–1)
GLUCOSE SERPL-MCNC: 87 MG/DL (ref 65–100)
POTASSIUM SERPL-SCNC: 3.6 MMOL/L (ref 3.5–5.1)
SERVICE CMNT-IMP: NORMAL
SODIUM SERPL-SCNC: 144 MMOL/L (ref 136–145)

## 2021-08-11 PROCEDURE — 36415 COLL VENOUS BLD VENIPUNCTURE: CPT

## 2021-08-11 PROCEDURE — 74011250637 HC RX REV CODE- 250/637: Performed by: INTERNAL MEDICINE

## 2021-08-11 PROCEDURE — 80048 BASIC METABOLIC PNL TOTAL CA: CPT

## 2021-08-11 RX ADMIN — POTASSIUM CHLORIDE 40 MEQ: 20 TABLET, EXTENDED RELEASE ORAL at 09:17

## 2021-08-11 RX ADMIN — ESCITALOPRAM OXALATE 10 MG: 10 TABLET ORAL at 09:17

## 2021-08-11 RX ADMIN — RDII 250 MG CAPSULE 500 MG: at 09:17

## 2021-08-11 NOTE — PROGRESS NOTES
D/c paperwork reviewed with  at bedside, and signed by  d/t him being primary caregiver. All questions answered. PIV removed with no issue. No further questions at this time.

## 2021-08-11 NOTE — DISCHARGE SUMMARY
[facility] Hospitalist Discharge Summary    Patient ID:  Matt Avilez. female. 1950.  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 ([facility] [address] G30.0, F02.81    Anemia D64.9    Hypoxia R09.02    Acute blood loss anemia D62    Hypokalemia E87.6    UTI (urinary tract infection) N39.0    Diarrhea R19.7    Hyperglycemia R73.9    Hypotension I95.9    Hypomagnesemia E83.42       Discharge Diagnosis:   1: Accidental drug overdose  2: Hypotension and hypovolemic shock secondary to accidental drug overdose   3: 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 female presented with past medical history of    Alzheimer's dementia     Patient was admitted initially for cilostazol overdose. She mistakenly took 60 tablets of cilostazole at home. She was hypotensive initially and admitted to ICU and was on Levophed.      She is improved and levophed was stopped and she is moved out of ICU.     She is found to have drop in Hb. No overt bleeding is noted. She was treated for urinary tract infection with Rocephin. Her magnesium and potassium were corrected.     She is stable for discharge to home today    Assessment and Plan:  Accidental overdose.   -Her  has a better plan to secure his medications. 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 Patient is resting calmly.        Anemia (8/7/2021)  Hb stable  No active bleeding   Monitor           Hypokalemia (8/7/2021)    Without SHEELA   Resolved            UTI (urinary tract infection) (8/7/2021)  She is status post 3 days of IV Rocephin      Diarrhea (8/7/2021)  Resolved    Hyperglycemia (8/7/2021)  Resolved       Hypotension and hypovolemic shock (8/7/2021)  From an intentional drug overdose overdose.   Resolved     Hypomagnesemia  -Resolved     Discharge Exam:  Visit Vitals  BP (!) 141/89   Pulse 86   Temp 97.3 °F (36.3 °C)   Resp 18   Wt 49.4 kg (108 lb 14.5 oz)   SpO2 96%   BMI 19.60 kg/m²      General: No acute distress, speaking in full sentences, no use of accessory muscles   HEENT: Pupils equal and reactive to light and accommodation, oropharynx is clear   Neck: Supple, no lymphadenopathy, no JVD   Lungs: Clear to auscultation bilaterally   Cardiovascular: Regular rate and rhythm with normal S1 and S2   Abdomen: Soft, nontender, nondistended, normoactive bowel sounds   Extremities: No cyanosis clubbing or edema   Neuro: Nonfocal, A&O x3   Psych: Normal affect     Consults: IP CONSULT TO HOSPITALIST    Code Status: DNR    Significant Diagnostic Studies:   CMP:   Lab Results   Component Value Date/Time     08/11/2021 05:55 AM    K 3.6 08/11/2021 05:55 AM     (H) 08/11/2021 05:55 AM    CO2 27 08/11/2021 05:55 AM    AGAP 6 (L) 08/11/2021 05:55 AM    GLU 87 08/11/2021 05:55 AM    BUN 4 (L) 08/11/2021 05:55 AM    CREA 0.48 (L) 08/11/2021 05:55 AM    GFRAA >60 08/11/2021 05:55 AM    GFRNA >60 08/11/2021 05:55 AM    CA 8.7 08/11/2021 05:55 AM         CBC:  No results found for: WBC, HGB, HCT, PLT, HGBEXT, HCTEXT, PLTEXT    Lab Results   Component Value Date/Time    INR 1.2 08/06/2021 07:11 PM    Prothrombin time 15.3 (H) 08/06/2021 07:11 PM       ABG:  No results found for: PH, PHI, PCO2, PCO2I, PO2, PO2I, HCO3, HCO3I, FIO2, FIO2I        No results found for: CPK, RCK1, RCK2, RCK3, RCK4, CKMB, CKNDX, CKND1, TROPT, TROIQ, BNPP, BNP        Radiology Reports :   [unfilled]    EKG:  EKG Results     Procedure 720 Value Units Date/Time    EKG, 12 LEAD, INITIAL [[id number]] Collected: 08/07/21 1651    Order Status: Completed Updated: 08/07/21 1722     Ventricular Rate 127 BPM      Atrial Rate 127 BPM      P-R Interval 124 ms      QRS Duration 70 ms      Q-T Interval 270 ms      QTC Calculation (Bezet) 392 ms      Calculated P Axis 80 degrees      Calculated R Axis 40 degrees      Calculated T Axis 89 degrees      Diagnosis --     Sinus tachycardia  Otherwise normal ECG  When compared with ECG of 06-AUG-2021 14:28,  QRS axis Shifted right  Confirmed by EMERITA MCCOY (), RICHARD SANCHEZ ([id number]) on 8/7/2021 5:22:27 PM      EKG [[id number]] Collected: 08/06/21 1428    Order Status: Completed Updated: 08/06/21 1640     Ventricular Rate 128 BPM      Atrial Rate 128 BPM      P-R Interval 130 ms      QRS Duration 68 ms      Q-T Interval 300 ms      QTC Calculation (Bezet) 438 ms      Calculated P Axis 91 degrees      Calculated R Axis -50 degrees      Calculated T Axis 82 degrees      Diagnosis --     !! AGE AND GENDER SPECIFIC ECG ANALYSIS !! Sinus tachycardia  Right atrial enlargement  Pulmonary disease pattern  Left anterior fascicular block  Abnormal ECG  No previous ECGs available  Confirmed by ST JENNIFER ARNOLD MD (), ADALID LINK ([id number]) on 8/6/2021 4:39:58 PM            Discharge Medications:  Current Discharge Medication List      CONTINUE these medications which have NOT CHANGED    Details   escitalopram oxalate (LEXAPRO) 10 mg tablet Take 10 mg by mouth daily.     Associated Diagnoses: Chronic obstructive pulmonary disease, unspecified COPD type ([facility] [address]      umeclidinium-vilanterol (ANORO ELLIPTA) 62.5-25 mcg/actuation inhaler Take 1 Puff by inhalation daily.  Qty: 1 Inhaler, Refills: 6             Medications Discontinued during this hospitalization:   Medications Discontinued During This Encounter   Medication Reason    NOREPINephrine (LEVOPHED) 8,000 mcg in dextrose 5% 250 mL infusion REORDER    NOREPINephrine (LEVOPHED) 4,000 mcg in dextrose 5% 250 mL infusion     NOREPINephrine (LEVOPHED) 8 mg in 5% dextrose 250mL (32 mcg/mL) infusion DUPLICATE ORDER    vasopressin (VASOSTRICT) 20 Units in 0.9% sodium chloride 100 mL infusion     0.9% sodium chloride infusion     vasopressin (VASOSTRICT) 20 Units in 0.9% sodium chloride 100 mL infusion     NOREPINephrine (LEVOPHED) 4 mg in 5% dextrose 250 mL infusion     potassium chloride (K-DUR, KLOR-CON) SR tablet 40 mEq        Patient Instructions: Follow-up as directed. Family to place medications in an unaccessible area to the patient  Activity: as tolerated. Diet:   ADULT DIET Dysphagia - Soft & Bite Sized    Therapy Ordered:  No therapy plan of the specified type found. Follow-up appointments:  No orders of the defined types were placed in this encounter. Time Spent on Discharge greater than 30 minutes.     Desiree Patrick MD  8/11/2021 7:20 AM

## 2021-08-11 NOTE — DISCHARGE INSTRUCTIONS
Patient Education     Patient Education      Patient Education        Diarrhea: Care Instructions  Your Care Instructions     Diarrhea is loose, watery stools (bowel movements). The exact cause is often hard to find. Sometimes diarrhea is your body's way of getting rid of what caused an upset stomach. Viruses, food poisoning, and many medicines can cause diarrhea. Some people get diarrhea in response to emotional stress, anxiety, or certain foods. Almost everyone has diarrhea now and then. It usually isn't serious, and your stools will return to normal soon. The important thing to do is replace the fluids you have lost, so you can prevent dehydration. The doctor has checked you carefully, but problems can develop later. If you notice any problems or new symptoms, get medical treatment right away. Follow-up care is a key part of your treatment and safety. Be sure to make and go to all appointments, and call your doctor if you are having problems. It's also a good idea to know your test results and keep a list of the medicines you take. How can you care for yourself at home? · Watch for signs of dehydration, which means your body has lost too much water. Dehydration is a serious condition and should be treated right away. Signs of dehydration are:  ? Increasing thirst and dry eyes and mouth. ? Feeling faint or lightheaded. ? A smaller amount of urine than normal.  · To prevent dehydration, drink plenty of fluids. Choose water and other caffeine-free clear liquids until you feel better. If you have kidney, heart, or liver disease and have to limit fluids, talk with your doctor before you increase the amount of fluids you drink. · Begin eating small amounts of mild foods the next day, if you feel like it. ? Try yogurt that has live cultures of Lactobacillus. (Check the label.)  ? Avoid spicy foods, fruits, alcohol, and caffeine until 48 hours after all symptoms are gone. ?  Avoid chewing gum that contains sorbitol. ? Avoid dairy products (except for yogurt with Lactobacillus) while you have diarrhea and for 3 days after symptoms are gone. · The doctor may recommend that you take over-the-counter medicine, such as loperamide (Imodium), if you still have diarrhea after 6 hours. Read and follow all instructions on the label. Do not use this medicine if you have bloody diarrhea, a high fever, or other signs of serious illness. Call your doctor if you think you are having a problem with your medicine. When should you call for help? Call 911 anytime you think you may need emergency care. For example, call if:    · You passed out (lost consciousness).     · Your stools are maroon or very bloody. Call your doctor now or seek immediate medical care if:    · You are dizzy or lightheaded, or you feel like you may faint.     · Your stools are black and look like tar, or they have streaks of blood.     · You have new or worse belly pain.     · You have symptoms of dehydration, such as:  ? Dry eyes and a dry mouth. ? Passing only a little urine. ? Feeling thirstier than usual.     · You have a new or higher fever. Watch closely for changes in your health, and be sure to contact your doctor if:    · Your diarrhea is getting worse.     · You see pus in the diarrhea.     · You are not getting better after 2 days (48 hours). Where can you learn more? Go to http://www.gray.com/  Enter [id number] in the search box to learn more about \"Diarrhea: Care Instructions. \"  Current as of: February 26, 2020               Content Version: 12.8  © 2006-2021 [employer]. Care instructions adapted under license by [employer] (which disclaims liability or warranty for this information). If you have questions about a medical condition or this instruction, always ask your healthcare professional. [address] any warranty or liability for your use of this information. Alzheimer's Disease: Care Instructions  Overview     Alzheimer's disease is a type of dementia. It causes memory loss and affects judgment, language, and behavior. You may have trouble making decisions or may get lost in places that you used to know well. Alzheimer's disease is different than mild memory loss that occurs with aging. It's not clear what causes Alzheimer's disease. It's the most common form of dementia in older adults. Although there is no cure at this time, medicine in some cases may slow memory loss for a while. Other medicines may help with sleep, depression, or behavior changes. Alzheimer's disease is different for everyone. Some people can function well for a long time. In the early stage of the disease, you can do things at home to make life easier and safer. You also can keep doing your hobbies and other activities. Many people find comfort in planning now for their future needs. Follow-up care is a key part of your treatment and safety. Be sure to make and go to all appointments, and call your doctor if you are having problems. It's also a good idea to know your test results and keep a list of the medicines you take. How can you care for yourself at home? Taking care of yourself  · If your doctor gives you medicines, take them exactly as prescribed. Call your doctor if you think you are having a problem with your medicine. You will get more details on the medicines your doctor prescribes. · Eat a balanced diet. Get plenty of whole grains, fruits, and vegetables every day. If you are not hungry at mealtimes, eat snacks at midmorning and in the afternoon. Try drinks such as Boost, Ensure, or Sustacal if you are having trouble keeping your weight up. · Stay active. Exercise such as walking may slow the decline of your mental abilities. Try to stay active mentally too. Read and work crossword puzzles if you enjoy these activities.   · If you have trouble sleeping, do not nap during the day. Get regular exercise (but not within several hours of bedtime). Drink a glass of warm milk or caffeine-free herbal tea before going to bed. · Ask your doctor about support groups and other resources in your area. They can help people who have Alzheimer's disease and their families. · Be patient. You may find that a task takes you longer than it used to. · If you have not already done so, make a list of advance directives. Advance directives are instructions to your doctor and family members about what kind of care you want if you become unable to speak or express yourself. Talk to a  about making a will, if you do not already have one. Keeping schedules  · Develop a routine. You will feel less frustrated or confused if you have a clear, simple plan of what to do every day. ? Make lists of your medicines and when to take them. ? Write down appointments and other tasks in a calendar. ? Put sticky notes around the house to help you remember events and other things you have to do. ? Schedule activities and tasks for times of the day when you are best able to handle them. Staying safe  · Tell someone when you are going out and where you are going. Let the person know when you will be back. Before you go out alone, write down where you are going, how to get there, and how to get back home. Do this even if you have gone there many times before. Take someone along with you when possible. · Make your home safe. Tack down rugs, put no-slip tape in the tub, use handrails, and put safety switches on stoves and appliances. · Have a family member or other caregiver tell you whether you are driving badly. Deciding to stop driving is very hard for many people. Driving helps you feel independent. Your state 's license bureau can do a driving test if there is any question. Plan for other means of getting around when you are no longer able to drive. · Use strong lighting, especially at night.  Put night-lights in bedrooms, hallways, and bathrooms. · Lower the hot water temperature setting to 120°F or lower to avoid burns. When should you call for help? Call 911 anytime you think you may need emergency care. For example, call if:    · You are lost and do not know whom to call.     · You are injured and do not know whom to call. Call your doctor now or seek immediate medical care if:    · Your symptoms suddenly get much worse. Watch closely for changes in your health, and be sure to contact your doctor if:    · You want more information about how you can take care of yourself. Where can you learn more? Go to http://jessaTNT Crowdaviva.info/  Enter Y179 in the search box to learn more about \"Alzheimer's Disease: Care Instructions. \"  Current as of: September 23, 2020               Content Version: 12.8  © 7962-3847 [employer]. Care instructions adapted under license by [employer] (which disclaims liability or warranty for this information). If you have questions about a medical condition or this instruction, always ask your healthcare professional. [place] [address] any warranty or liability for your use of this information. Patient Education   Patient Education        General Discharge: Care Instructions  Your Care Instructions     One or more doctors have given you a physical exam, reviewed your symptoms, and asked about your past medical problems. You have had tests as needed. At this time, the doctors feel it is safe for you to go home. It is very important that you follow up with your doctor as directed. If you continue to have symptoms, you may need more tests or treatment. The doctor has checked you carefully, but problems can develop later. If you notice any problems or new symptoms,  get medical treatment right away. Follow-up care is a key part of your treatment and safety.  Be sure to make and go to all appointments, and call your doctor if you are having problems. It's also a good idea to know your test results and keep a list of the medicines you take. How can you care for yourself at home? · Keep track of any new symptoms or changes in your symptoms. · Rest until you feel better. · Be safe with medicines. Take your medicines exactly as prescribed. Call your doctor if you think you are having a problem with your medicine. · Do not drive after taking a prescription pain medicine. When should you call for help? Call 911 anytime you think you may need emergency care. For example, call if:    · You passed out (lost consciousness). Call your doctor now or seek immediate medical care if:    · You have new symptoms like fever, difficulty breathing, vomiting, or rash.     · You have new or different pain.     · You are confused and are having trouble thinking clearly.     · Your symptoms are getting worse. Watch closely for changes in your health, and be sure to contact your doctor if:    · You do not get better as expected. Where can you learn more? Go to http://www.gray.com/  Enter G150 in the search box to learn more about \"General Discharge: Care Instructions. \"  Current as of: February 26, 2020               Content Version: 12.8  © 2006-2021 [employer]. Care instructions adapted under license by [employer] (which disclaims liability or warranty for this information). If you have questions about a medical condition or this instruction, always ask your healthcare professional. [place] [address] any warranty or liability for your use of this information. Low Blood Pressure: Care Instructions  Your Care Instructions     Blood pressure is a measurement of the force of the blood against the walls of the blood vessels during and after each beat of the heart. Low blood pressure is also called hypotension.  It means that your blood pressure is much lower than normal. Some people, especially young, slim women, may have slightly low blood pressure without symptoms. But in many people, low blood pressure can cause symptoms such as feeling dizzy or lightheaded. When your blood pressure is too low, your heart, brain, and other organs do not get enough blood. Low blood pressure can be caused by many things, including heart problems and some medicines. Diabetes that is not under control can cause your blood pressure to drop. And so can a severe allergic reaction or infection. Another cause is dehydration, which is when your body loses too much fluid. Treatment for low blood pressure depends on the cause. Follow-up care is a key part of your treatment and safety. Be sure to make and go to all appointments, and call your doctor if you are having problems. It's also a good idea to know your test results and keep a list of the medicines you take. How can you care for yourself at home? · Be safe with medicines. Call your doctor if you think you are having a problem with your medicine. You will get more details on the specific medicines your doctor prescribes. · If you feel dizzy or lightheaded, sit down or lie down for a few minutes. Or you can sit down and put your head between your knees. This will help your blood pressure go back to normal and help your symptoms go away. · Follow your doctor's suggestions for ways to prevent symptoms like dizziness. These suggestions may include:  ? Get up slowly from bed or after sitting for a long time. If you are in bed, roll to your side and swing your legs over the edge of the bed and onto the floor. Push your body up to a sitting position. Wait for a while before you slowly stand up.  ? Add more salt to your diet, if your doctor recommends it. ? Drink plenty of fluids. Choose water and other caffeine-free clear liquids.  If you have kidney, heart, or liver disease and have to limit fluids, talk with your doctor before you increase the amount of fluids you drink. ? Avoid or limit alcohol to 2 drinks a day for men and 1 drink a day for women. Alcohol may interfere with your medicine. In addition, alcohol can make your low blood pressure worse by causing your body to lose water. ? Avoid or limit caffeine. Caffeine can cause your body to lose water. ? Wear compression stockings to help improve blood flow. When should you call for help? Call 911 anytime you think you may need emergency care. For example, call if:    · You passed out (lost consciousness). Call your doctor now or seek immediate medical care if:    · You are dizzy or lightheaded, or you feel like you may faint. Watch closely for changes in your health, and be sure to contact your doctor if you have any problems. Where can you learn more? Go to http://jessa-aviva.info/  Enter C304 in the search box to learn more about \"Low Blood Pressure: Care Instructions. \"  Current as of: August 31, 2020               Content Version: 12.8  © 2006-2021 [employer]. Care instructions adapted under license by [employer] (which disclaims liability or warranty for this information). If you have questions about a medical condition or this instruction, always ask your healthcare professional. [address] any warranty or liability for your use of this information. Accidental Overdose of Medicine: Care Instructions  Your Care Instructions     Almost any medicine can cause harm if you take too much of it. You have had treatment to help your body get rid of an overdose of a medicine. This may have been an over-the-counter medicine. Or it might be one that a doctor prescribed. It may even have been a vitamin or a supplement. During treatment, the doctor may have given you fluids and medicine. You also may have had lab tests. Then the doctor made sure that you were well enough to go home.   The doctor has checked you carefully, but problems can develop later. If you notice any problems or new symptoms, get medical treatment right away. Follow-up care is a key part of your treatment and safety. Be sure to make and go to all appointments, and call your doctor if you are having problems. It's also a good idea to know your test results and keep a list of the medicines you take. How can you care for yourself at home? Home care  · Talk with your doctor about what you should eat or drink. · If you normally take medicines, ask your doctor when you can start taking them again. · Read the information that comes with any medicine. If you have questions, ask your doctor or pharmacist.  Prevention  · Be safe with medicines. Take your medicines exactly as prescribed or as the label directs. Call your doctor if you think you are having a problem with your medicine. · Keep your medicines in the containers they came in. Keep them with the original labels. · Find out what to do if you miss a dose of your medicine. When should you call for help? Poison control centers, hospitals, or your doctor can give immediate advice in the case of a poisoning. The [employer] & other things you bake  [employer] number is [phone]. Have the poison container with you so you can give complete information to the poison control center. This includes what the poison or substance is, when it was taken, and how much was taken. Do not try to make the person vomit. Call 911 anytime you think you may need emergency care. For example, call if you or someone else:    · Has used or currently uses alcohol or drugs and is very confused or can't stay awake.     · Has passed out (lost consciousness).     · Has severe trouble breathing.     · Is having a seizure. Call your doctor now or seek immediate medical care if:    · You are vomiting.     · You have a new or worse headache.     · You are dizzy or lightheaded or feel like you may faint. Watch closely for changes in your health, and be sure to contact your doctor if:    · You do not get better as expected. Where can you learn more? Go to http://www.gray.com/  Enter C165 in the search box to learn more about \"Accidental Overdose of Medicine: Care Instructions. \"  Current as of: May 27, 2020               Content Version: 12.8  © 9802-6657 [employer]. Care instructions adapted under license by [employer] (which disclaims liability or warranty for this information). If you have questions about a medical condition or this instruction, always ask your healthcare professional. [place] [address] any warranty or liability for your use of this information.

## 2021-08-11 NOTE — PROGRESS NOTES
Patient is up for discharge. Previous CM already placed order and referral for [facility]. Patient will be getting discharged home with [facility] at this time. No other needs identified at this time. Care Management Interventions  PCP Verified by CM:  Yes  Mode of Transport at Discharge: Self  Transition of Care Consult (CM Consult): [address]: Yes  Physical Therapy Consult: Yes  Occupational Therapy Consult: Yes  Current Support Network: Lives with Spouse  Confirm Follow Up Transport: Family  The Patient and/or Patient Representative was Provided with a Choice of Provider and Agrees with the Discharge Plan?: Yes  Freedom of Choice List was Provided with Basic Dialogue that Supports the Patient's Individualized Plan of Care/Goals, Treatment Preferences and Shares the Quality Data Associated with the Providers?: Yes  [place] Resource Information Provided?: No  Discharge Location  Discharge Placement: Home with [place] [facility]

## 2021-08-11 NOTE — PROGRESS NOTES
END OF SHIFT NOTE:  Patient sat up in chair. Rested quietly throughout the night. INTAKE/OUTPUT  08/10 0701 - 08/11 0700  In: [id number] [P.O.:360; I.V.:679]  Out: -   Voiding: YES  Catheter: NO  Drain:              Flatus: Patient does have flatus present. Stool:  1 occurrences. Characteristics:  Stool Assessment  Stool Color: Brown  Stool Appearance: Loose  Stool Amount: Medium  Stool Source/Status: Rectum    Emesis: 0 occurrences. Characteristics:        VITAL SIGNS  Patient Vitals for the past 12 hrs:   Temp Pulse Resp BP SpO2   08/11/21 0429 97.6 °F (36.4 °C) 81 17 118/69 --   08/11/21 0015 97.4 °F (36.3 °C) 94 18 133/77 96 %       Pain Assessment  Pain Intensity 1: 0 (08/10/21 0734)        Patient Stated Pain Goal: Unable to verbalize/indicate pain    Ambulating  Yes    Shift report given to oncoming nurse at the bedside.     Martinez Lopez RN

## 2022-03-18 PROBLEM — F02.818 EARLY ONSET ALZHEIMER'S DEMENTIA WITH BEHAVIORAL DISTURBANCE (HCC): Status: ACTIVE | Noted: 2021-08-06

## 2022-03-18 PROBLEM — R73.9 HYPERGLYCEMIA: Status: ACTIVE | Noted: 2021-08-07

## 2022-03-18 PROBLEM — G30.0 EARLY ONSET ALZHEIMER'S DEMENTIA WITH BEHAVIORAL DISTURBANCE (HCC): Status: ACTIVE | Noted: 2021-08-06

## 2022-03-18 PROBLEM — N39.0 UTI (URINARY TRACT INFECTION): Status: ACTIVE | Noted: 2021-08-07

## 2022-03-18 PROBLEM — D64.9 ANEMIA: Status: ACTIVE | Noted: 2021-08-07

## 2022-03-18 PROBLEM — T50.901A OVERDOSE: Status: ACTIVE | Noted: 2021-08-06

## 2022-03-19 PROBLEM — R19.7 DIARRHEA: Status: ACTIVE | Noted: 2021-08-07

## 2022-03-19 PROBLEM — I95.9 HYPOTENSION: Status: ACTIVE | Noted: 2021-08-07

## 2022-03-19 PROBLEM — E83.42 HYPOMAGNESEMIA: Status: ACTIVE | Noted: 2021-08-08

## 2022-03-19 PROBLEM — R09.02 HYPOXIA: Status: ACTIVE | Noted: 2021-08-07

## 2022-03-19 PROBLEM — E87.6 HYPOKALEMIA: Status: ACTIVE | Noted: 2021-08-07

## 2022-03-20 PROBLEM — D62 ACUTE BLOOD LOSS ANEMIA: Status: ACTIVE | Noted: 2021-08-07

## 2022-12-26 ENCOUNTER — APPOINTMENT (OUTPATIENT)
Dept: GENERAL RADIOLOGY | Age: 72
End: 2022-12-26
Payer: MEDICARE

## 2022-12-26 ENCOUNTER — HOSPITAL ENCOUNTER (EMERGENCY)
Age: 72
Discharge: HOME | End: 2022-12-26
Attending: EMERGENCY MEDICINE | Admitting: EMERGENCY MEDICINE
Payer: MEDICARE

## 2022-12-26 ENCOUNTER — APPOINTMENT (OUTPATIENT)
Dept: CT IMAGING | Age: 72
End: 2022-12-26
Payer: MEDICARE

## 2022-12-26 VITALS
DIASTOLIC BLOOD PRESSURE: 100 MMHG | TEMPERATURE: 96.3 F | SYSTOLIC BLOOD PRESSURE: 149 MMHG | WEIGHT: 128.7 LBS | HEART RATE: 94 BPM | RESPIRATION RATE: 22 BRPM | OXYGEN SATURATION: 96 %

## 2022-12-26 DIAGNOSIS — R56.9 SEIZURE (HCC): Primary | ICD-10-CM

## 2022-12-26 DIAGNOSIS — U07.1 COVID-19 VIRUS INFECTION: ICD-10-CM

## 2022-12-26 LAB
ANION GAP SERPL CALC-SCNC: 4 MMOL/L (ref 2–11)
ARTERIAL PATENCY WRIST A: ABNORMAL
BASE EXCESS BLDV CALC-SCNC: 5.9 MMOL/L
BASOPHILS # BLD: 0 K/UL (ref 0–0.2)
BASOPHILS NFR BLD: 0 % (ref 0–2)
BDY SITE: ABNORMAL
BUN SERPL-MCNC: 18 MG/DL (ref 8–23)
CALCIUM SERPL-MCNC: 9.1 MG/DL (ref 8.3–10.4)
CHLORIDE SERPL-SCNC: 107 MMOL/L (ref 101–110)
CO2 SERPL-SCNC: 29 MMOL/L (ref 21–32)
CREAT SERPL-MCNC: 1 MG/DL (ref 0.6–1)
DIFFERENTIAL METHOD BLD: ABNORMAL
EKG ATRIAL RATE: 111 BPM
EKG DIAGNOSIS: NORMAL
EKG P AXIS: 96 DEGREES
EKG P-R INTERVAL: 123 MS
EKG Q-T INTERVAL: 317 MS
EKG QRS DURATION: 62 MS
EKG QTC CALCULATION (BAZETT): 433 MS
EKG R AXIS: -59 DEGREES
EKG T AXIS: 78 DEGREES
EKG VENTRICULAR RATE: 111 BPM
EOSINOPHIL # BLD: 0.1 K/UL (ref 0–0.8)
EOSINOPHIL NFR BLD: 1 % (ref 0.5–7.8)
ERYTHROCYTE [DISTWIDTH] IN BLOOD BY AUTOMATED COUNT: 11.9 % (ref 11.9–14.6)
FLUAV AG NPH QL IA: NEGATIVE
FLUBV AG NPH QL IA: NEGATIVE
GAS FLOW.O2 O2 DELIVERY SYS: ABNORMAL L/MIN
GLUCOSE SERPL-MCNC: 132 MG/DL (ref 65–100)
HCO3 BLDV-SCNC: 30.6 MMOL/L (ref 23–28)
HCT VFR BLD AUTO: 40.9 % (ref 35.8–46.3)
HGB BLD-MCNC: 12.8 G/DL (ref 11.7–15.4)
IMM GRANULOCYTES # BLD AUTO: 0 K/UL (ref 0–0.5)
IMM GRANULOCYTES NFR BLD AUTO: 0 % (ref 0–5)
LYMPHOCYTES # BLD: 1 K/UL (ref 0.5–4.6)
LYMPHOCYTES NFR BLD: 16 % (ref 13–44)
MCH RBC QN AUTO: 29 PG (ref 26.1–32.9)
MCHC RBC AUTO-ENTMCNC: 31.3 G/DL (ref 31.4–35)
MCV RBC AUTO: 92.7 FL (ref 82–102)
MONOCYTES # BLD: 0.8 K/UL (ref 0.1–1.3)
MONOCYTES NFR BLD: 12 % (ref 4–12)
NEUTS SEG # BLD: 4.6 K/UL (ref 1.7–8.2)
NEUTS SEG NFR BLD: 71 % (ref 43–78)
NRBC # BLD: 0 K/UL (ref 0–0.2)
NT PRO BNP: 154 PG/ML (ref 5–125)
O2/TOTAL GAS SETTING VFR VENT: 21 %
PCO2 BLDV: 44.7 MMHG (ref 41–51)
PH BLDV: 7.44 [PH] (ref 7.32–7.42)
PLATELET # BLD AUTO: 314 K/UL (ref 150–450)
PMV BLD AUTO: 8.4 FL (ref 9.4–12.3)
PO2 BLDV: 23 MMHG
POTASSIUM SERPL-SCNC: 3.3 MMOL/L (ref 3.5–5.1)
RBC # BLD AUTO: 4.41 M/UL (ref 4.05–5.2)
SAO2 % BLDV: 41.2 % (ref 65–88)
SARS-COV-2 RDRP RESP QL NAA+PROBE: DETECTED
SERVICE CMNT-IMP: ABNORMAL
SERVICE CMNT-IMP: YES
SODIUM SERPL-SCNC: 140 MMOL/L (ref 133–143)
SOURCE: ABNORMAL
SPECIMEN SOURCE: NORMAL
SPECIMEN TYPE: ABNORMAL
TROPONIN I SERPL HS-MCNC: 4.5 PG/ML (ref 0–14)
TROPONIN I SERPL HS-MCNC: 4.7 PG/ML (ref 0–14)
WBC # BLD AUTO: 6.6 K/UL (ref 4.3–11.1)

## 2022-12-26 PROCEDURE — 94640 AIRWAY INHALATION TREATMENT: CPT

## 2022-12-26 PROCEDURE — 85025 COMPLETE CBC W/AUTO DIFF WBC: CPT

## 2022-12-26 PROCEDURE — 99285 EMERGENCY DEPT VISIT HI MDM: CPT

## 2022-12-26 PROCEDURE — 87804 INFLUENZA ASSAY W/OPTIC: CPT

## 2022-12-26 PROCEDURE — 96374 THER/PROPH/DIAG INJ IV PUSH: CPT

## 2022-12-26 PROCEDURE — 80048 BASIC METABOLIC PNL TOTAL CA: CPT

## 2022-12-26 PROCEDURE — 6370000000 HC RX 637 (ALT 250 FOR IP): Performed by: EMERGENCY MEDICINE

## 2022-12-26 PROCEDURE — 70450 CT HEAD/BRAIN W/O DYE: CPT

## 2022-12-26 PROCEDURE — 84484 ASSAY OF TROPONIN QUANT: CPT

## 2022-12-26 PROCEDURE — 36600 WITHDRAWAL OF ARTERIAL BLOOD: CPT

## 2022-12-26 PROCEDURE — 71045 X-RAY EXAM CHEST 1 VIEW: CPT

## 2022-12-26 PROCEDURE — 87635 SARS-COV-2 COVID-19 AMP PRB: CPT

## 2022-12-26 PROCEDURE — 82803 BLOOD GASES ANY COMBINATION: CPT

## 2022-12-26 PROCEDURE — 6360000002 HC RX W HCPCS: Performed by: EMERGENCY MEDICINE

## 2022-12-26 PROCEDURE — 83880 ASSAY OF NATRIURETIC PEPTIDE: CPT

## 2022-12-26 RX ORDER — METHYLPREDNISOLONE SODIUM SUCCINATE 125 MG/2ML
125 INJECTION, POWDER, LYOPHILIZED, FOR SOLUTION INTRAMUSCULAR; INTRAVENOUS
Status: COMPLETED | OUTPATIENT
Start: 2022-12-26 | End: 2022-12-26

## 2022-12-26 RX ORDER — IPRATROPIUM BROMIDE AND ALBUTEROL SULFATE 2.5; .5 MG/3ML; MG/3ML
1 SOLUTION RESPIRATORY (INHALATION)
Status: COMPLETED | OUTPATIENT
Start: 2022-12-26 | End: 2022-12-26

## 2022-12-26 RX ADMIN — IPRATROPIUM BROMIDE AND ALBUTEROL SULFATE 1 AMPULE: 2.5; .5 SOLUTION RESPIRATORY (INHALATION) at 11:15

## 2022-12-26 RX ADMIN — METHYLPREDNISOLONE SODIUM SUCCINATE 125 MG: 125 INJECTION, POWDER, FOR SOLUTION INTRAMUSCULAR; INTRAVENOUS at 10:26

## 2022-12-26 NOTE — ED TRIAGE NOTES
Pt. Arrives via ems from home today. Family called ems d/t caregiver recognizing change in breathing pattern. Hx of dimentia, BP: 126/76, HR: 105, BGL: 196, unable to get patient to cooperate to get temp. 90% on room air.

## 2022-12-26 NOTE — ED PROVIDER NOTES
[facility] Provider Note                   PCP:                Kaveh Guadalupe               Age: 81 y.o. Sex: female       Yolande Garza is a 81 y.o. female who presents to the Emergency Department with chief complaint of  No chief complaint on file. Patient was brought in by EMS for difficulty breathing. Reportedly patient's caregiver noticed a change in her breathing pattern. Patient was found to have normal blood pressure with mild tachycardia and oxygen saturation 90% on room air. Patient has dementia and is confused at baseline and cannot give any history or answer questions appropriately. Patient reportedly does have COPD. The history is provided by the EMS personnel. History limited by: Patient dementia. All other systems reviewed and are negative. Review of Systems   Unable to perform ROS: Dementia     Past Medical History:   Diagnosis Date    Anxiety     Depression         Past Surgical History:   Procedure Laterality Date    APPENDECTOMY          Family History   Problem Relation Age of Onset    Breast Cancer Neg Hx         Social History     Socioeconomic History    Marital status:    Tobacco Use    Smoking status: Every Day     Packs/day: 1.00     Types: Cigarettes    Smokeless tobacco: Never        Allergies: Patient has no known allergies. Previous Medications    ESCITALOPRAM (LEXAPRO) 10 MG TABLET    Take 10 mg by mouth daily    UMECLIDINIUM-VILANTEROL (ANORO ELLIPTA) 62.5-25 MCG/INH AEPB INHALER    Inhale 1 puff into the lungs daily        Vitals signs and nursing note reviewed. Patient Vitals for the past 4 hrs:   Pulse Resp SpO2   12/26/22 1114 94 22 96 %          Physical Exam  Vitals and nursing note reviewed. Constitutional:       Appearance: Normal appearance. HENT:      Head: Normocephalic and atraumatic. Cardiovascular:      Rate and Rhythm: Normal rate and regular rhythm. Pulses: Normal pulses.       Heart sounds: Normal heart sounds. Pulmonary:      Effort: Pulmonary effort is normal.      Breath sounds: Examination of the right-middle field reveals decreased breath sounds. Examination of the left-middle field reveals decreased breath sounds. Examination of the right-lower field reveals decreased breath sounds. Examination of the left-lower field reveals decreased breath sounds. Decreased breath sounds present. Chest:      Chest wall: No tenderness. Abdominal:      General: Abdomen is flat. Bowel sounds are normal.      Palpations: Abdomen is soft. Tenderness: There is no abdominal tenderness. Musculoskeletal:         General: No swelling or tenderness. Cervical back: Normal range of motion and neck supple. No tenderness. Right lower leg: No tenderness. No edema. Left lower leg: No tenderness. No edema. Skin:     General: Skin is warm and dry. Neurological:      General: No focal deficit present. Mental Status: She is alert.         Orders Placed This Encounter   Procedures    COVID-19, Rapid    Rapid influenza A/B antigens    XR CHEST PORTABLE    CT HEAD WO CONTRAST    CBC with Auto Differential    Basic Metabolic Panel    Troponin    Troponin    Blood Gas, Venous    Brain Natriuretic Peptide    Cardiac Monitor - ED Only    Continuous Pulse Oximetry    Venous Blood Gas, POC    EKG 12 Lead    Saline lock IV       Procedures    ED EKG Interpretation  EKG was interpreted in the absence of a cardiologist.    Rate: Rate: Tachycardia  EKG Interpretation: EKG Interpretation: sinus rhythm  ST Segments: Nonspecific ST segments - NO STEMI      Results Include:    Recent Results (from the past 24 hour(s))   EKG 12 Lead    Collection Time: 12/26/22  9:36 AM   Result Value Ref Range    Ventricular Rate 111 BPM    Atrial Rate 111 BPM    P-R Interval 123 ms    QRS Duration 62 ms    Q-T Interval 317 ms    QTc Calculation (Bazett) 433 ms    P Axis 96 degrees    R Axis -59 degrees    T Axis 78 degrees Diagnosis Sinus tachycardia    CBC with Auto Differential    Collection Time: 12/26/22  9:51 AM   Result Value Ref Range    WBC 6.6 4.3 - 11.1 K/uL    RBC 4.41 4.05 - 5.2 M/uL    Hemoglobin 12.8 11.7 - 15.4 g/dL    Hematocrit 40.9 35.8 - 46.3 %    MCV 92.7 82 - 102 FL    MCH 29.0 26.1 - 32.9 PG    MCHC 31.3 (L) 31.4 - 35.0 g/dL    RDW 11.9 11.9 - 14.6 %    Platelets 922 333 - 161 K/uL    MPV 8.4 (L) 9.4 - 12.3 FL    nRBC 0.00 0.0 - 0.2 K/uL    Differential Type AUTOMATED      Seg Neutrophils 71 43 - 78 %    Lymphocytes 16 13 - 44 %    Monocytes 12 4.0 - 12.0 %    Eosinophils % 1 0.5 - 7.8 %    Basophils 0 0.0 - 2.0 %    Immature Granulocytes 0 0.0 - 5.0 %    Segs Absolute 4.6 1.7 - 8.2 K/UL    Absolute Lymph # 1.0 0.5 - 4.6 K/UL    Absolute Mono # 0.8 0.1 - 1.3 K/UL    Absolute Eos # 0.1 0.0 - 0.8 K/UL    Basophils Absolute 0.0 0.0 - 0.2 K/UL    Absolute Immature Granulocyte 0.0 0.0 - 0.5 K/UL   Basic Metabolic Panel    Collection Time: 12/26/22  9:51 AM   Result Value Ref Range    Sodium 140 133 - 143 mmol/L    Potassium 3.3 (L) 3.5 - 5.1 mmol/L    Chloride 107 101 - 110 mmol/L    CO2 29 21 - 32 mmol/L    Anion Gap 4 2 - 11 mmol/L    Glucose 132 (H) 65 - 100 mg/dL    BUN 18 8 - 23 MG/DL    Creatinine 1.00 0.6 - 1.0 MG/DL    Est, Glom Filt Rate >60 >60 ml/min/1.73m2    Calcium 9.1 8.3 - 10.4 MG/DL   Troponin    Collection Time: 12/26/22  9:51 AM   Result Value Ref Range    Troponin, High Sensitivity 4.5 0 - 14 pg/mL   COVID-19, Rapid    Collection Time: 12/26/22  9:51 AM    Specimen: Nasopharyngeal   Result Value Ref Range    Source Nasopharyngeal      SARS-CoV-2, Rapid Detected (AA) NOTD     Rapid influenza A/B antigens    Collection Time: 12/26/22  9:51 AM    Specimen: Nasal Washing   Result Value Ref Range    Influenza A Ag Negative NEG      Influenza B Ag Negative NEG      Source Nasopharyngeal     Brain Natriuretic Peptide    Collection Time: 12/26/22  9:51 AM   Result Value Ref Range    NT Pro- (H) 5 - 125 PG/ML   Venous Blood Gas, POC    Collection Time: 12/26/22 11:24 AM   Result Value Ref Range    DEVICE ROOM AIR      FIO2 21 %    PH, VENOUS (POC) 7.44 (H) 7.32 - 7.42      PCO2, [place], POC 44.7 41 - 51 MMHG    PO2, VENOUS (POC) 23 (LL) mmHg    HCO3, Venous 30.6 (H) 23 - 28 MMOL/L    SO2, VENOUS (POC) 41.2 (L) 65 - 88 %    BASE EXCESS, VENOUS (POC) 5.9 mmol/L    POC Mendez's Test NOT APPLICABLE      Site CENTRAL LINE      Specimen type: VENOUS BLOOD      Performed by: Roberto Hernandez     Critical Value Read Back YES    Troponin    Collection Time: 12/26/22 11:57 AM   Result Value Ref Range    Troponin, High Sensitivity 4.7 0 - 14 pg/mL          CT HEAD WO CONTRAST   Final Result   1. No acute intracranial abnormality. Note acute/subacute CVA cannot be   excluded given the severity of the underlying white matter disease. 2.  Severe bilateral white matter hypoattenuation most in keeping with chronic   microangiopathic disease with moderate cerebral atrophy. XR CHEST PORTABLE   Final Result   No acute abnormality. ED Course as of 12/26/22 1440   Mon Dec 26, 2022   3917 Patient's daughter is at bedside now. She states that for the past few days patient has had some cough and congestion. She states that they called EMS because patient had what appeared to be a seizure. She states that her arms and legs stiffened up and her tongue was sticking out. Patient became unresponsive. This lasted about 3 minutes. Patient has no history of seizures. Patient does have end-stage dementia and is very confused at baseline. [CW]   2002 Patient is resting comfortably in bed with pulse ox 90% on room air. Patient does have some mild rhonchi at her bases bilaterally but not in any distress. I updated the patient's daughter on the results and plan. She is not sure if she wants patient admitted because she does not do well in the hospital due to her dementia and becomes agitated.   She wants to think about it before making decision. [CW]   80 Patient's daughter has decided not to have patient admitted. She states that patient's  has  and that she is the one that currently makes her medical decisions. I explained the risk and benefits of taking the patient home versus being admitted. Patient's daughter voiced understanding and still insists on leaving [address] [CW]      ED Course User Index  [CW] Collins Barney MD       MDM  Number of Diagnoses or Management Options  COVID-19 virus infection  Seizure [facility])  Diagnosis management comments: Patient was brought in by EMS which initially was reported as shortness of breath. Patient does have COPD and has some diminished breath sounds. Patient was given steroids and DuoNeb. Patient's oxygen saturation was normal.  Her blood gas showed no acidosis. Patient's chest x-ray showed no acute findings. Patient's EKG showed normal sinus rhythm without any acute ischemic changes. Her troponin was normal.  Patient's BNP level was mildly elevated but no evidence of CHF on chest x-ray. Patient's COVID test is positive. Patient's daughter then arrived and details of what sounds like patient had a seizure. Patient has no history of seizures. Patient's head CT showed chronic changes but no acute findings. Patient has no focal neurologic deficits on exam to suspect stroke. Patient is very confused but this is her baseline due to her advanced dementia. Patient is not capable of making her own medical decisions due to the advanced dementia. Because this is a first-time seizure that has not been worked up, I recommended admission to the hospital.  The patient's daughter, who is the closest living relative that is capable of making decisions, refused admission to the hospital.  I explained the risk and benefit of this course of action.   Patient's daughter voiced understanding and still insist on signing out the patient AGAINST MEDICAL ADVICE. ICD-10-CM    1. Seizure ([facility] [address]  R56.9       2. COVID-19 virus infection  U07.1           DISPOSITION [facility] 12/26/2022 02:07:00 PM       Voice dictation software was used during the making of this note. This software is not perfect and grammatical and other typographical errors may be present. This note has not been completely proofread for errors.      Theresa Dimas MD  12/26/22 6851

## 2022-12-26 NOTE — ED NOTES
Pt. Signing out AMA. Followed up with the physician to confirm that was the health care power of  decision and also confirmed with the health care power of  face to face.       Dorota Calabrese RN  12/26/22 2309

## 2023-01-10 ENCOUNTER — HOSPITAL ENCOUNTER (INPATIENT)
Age: 73
LOS: 2 days | Discharge: HOME | DRG: 689 | End: 2023-01-12
Attending: EMERGENCY MEDICINE | Admitting: HOSPITALIST
Payer: MEDICARE

## 2023-01-10 ENCOUNTER — APPOINTMENT (OUTPATIENT)
Dept: GENERAL RADIOLOGY | Age: 73
DRG: 689 | End: 2023-01-10
Payer: MEDICARE

## 2023-01-10 DIAGNOSIS — N39.0 ACUTE UTI: ICD-10-CM

## 2023-01-10 DIAGNOSIS — R40.4 TRANSIENT ALTERATION OF AWARENESS: Primary | ICD-10-CM

## 2023-01-10 DIAGNOSIS — D72.829 LEUKOCYTOSIS, UNSPECIFIED TYPE: ICD-10-CM

## 2023-01-10 PROBLEM — B99.9 INFECTIOUS ENCEPHALOPATHY: Status: ACTIVE | Noted: 2023-01-10

## 2023-01-10 PROBLEM — G30.0 EARLY ONSET ALZHEIMER'S DEMENTIA WITH BEHAVIORAL DISTURBANCE (HCC): Status: ACTIVE | Noted: 2021-08-06

## 2023-01-10 PROBLEM — G93.49 INFECTIOUS ENCEPHALOPATHY: Status: ACTIVE | Noted: 2023-01-10

## 2023-01-10 PROBLEM — F02.818 EARLY ONSET ALZHEIMER'S DEMENTIA WITH BEHAVIORAL DISTURBANCE (HCC): Status: ACTIVE | Noted: 2021-08-06

## 2023-01-10 LAB
ALBUMIN SERPL-MCNC: 3.4 G/DL (ref 3.2–4.6)
ALBUMIN/GLOB SERPL: 0.9 (ref 0.4–1.6)
ALP SERPL-CCNC: 76 U/L (ref 50–136)
ALT SERPL-CCNC: 31 U/L (ref 12–65)
ANION GAP SERPL CALC-SCNC: 6 MMOL/L (ref 2–11)
APPEARANCE UR: CLEAR
AST SERPL-CCNC: 33 U/L (ref 15–37)
BACTERIA URNS QL MICRO: NORMAL /HPF
BASOPHILS # BLD: 0 K/UL (ref 0–0.2)
BASOPHILS NFR BLD: 0 % (ref 0–2)
BILIRUB SERPL-MCNC: 1.1 MG/DL (ref 0.2–1.1)
BILIRUB UR QL: NEGATIVE
BUN SERPL-MCNC: 18 MG/DL (ref 8–23)
CALCIUM SERPL-MCNC: 9.5 MG/DL (ref 8.3–10.4)
CASTS URNS QL MICRO: 0 /LPF
CHLORIDE SERPL-SCNC: 107 MMOL/L (ref 101–110)
CO2 SERPL-SCNC: 27 MMOL/L (ref 21–32)
COLOR UR: ABNORMAL
CREAT SERPL-MCNC: 1 MG/DL (ref 0.6–1)
CRYSTALS URNS QL MICRO: 0 /LPF
DIFFERENTIAL METHOD BLD: ABNORMAL
EOSINOPHIL # BLD: 0 K/UL (ref 0–0.8)
EOSINOPHIL NFR BLD: 0 % (ref 0.5–7.8)
EPI CELLS #/AREA URNS HPF: 0 /HPF
ERYTHROCYTE [DISTWIDTH] IN BLOOD BY AUTOMATED COUNT: 11.7 % (ref 11.9–14.6)
GLOBULIN SER CALC-MCNC: 3.6 G/DL (ref 2.8–4.5)
GLUCOSE SERPL-MCNC: 116 MG/DL (ref 65–100)
GLUCOSE UR STRIP.AUTO-MCNC: NEGATIVE MG/DL
HCT VFR BLD AUTO: 36 % (ref 35.8–46.3)
HGB BLD-MCNC: 12.2 G/DL (ref 11.7–15.4)
HGB UR QL STRIP: NEGATIVE
IMM GRANULOCYTES # BLD AUTO: 0.2 K/UL (ref 0–0.5)
IMM GRANULOCYTES NFR BLD AUTO: 1 % (ref 0–5)
KETONES UR QL STRIP.AUTO: NEGATIVE MG/DL
LACTATE SERPL-SCNC: 0.9 MMOL/L (ref 0.4–2)
LACTATE SERPL-SCNC: 1.6 MMOL/L (ref 0.4–2)
LACTATE SERPL-SCNC: 1.9 MMOL/L (ref 0.4–2)
LEUKOCYTE ESTERASE UR QL STRIP.AUTO: ABNORMAL
LYMPHOCYTES # BLD: 2.1 K/UL (ref 0.5–4.6)
LYMPHOCYTES NFR BLD: 11 % (ref 13–44)
MCH RBC QN AUTO: 29.3 PG (ref 26.1–32.9)
MCHC RBC AUTO-ENTMCNC: 33.9 G/DL (ref 31.4–35)
MCV RBC AUTO: 86.3 FL (ref 82–102)
MONOCYTES # BLD: 1 K/UL (ref 0.1–1.3)
MONOCYTES NFR BLD: 5 % (ref 4–12)
MUCOUS THREADS URNS QL MICRO: 0 /LPF
NEUTS SEG # BLD: 16 K/UL (ref 1.7–8.2)
NEUTS SEG NFR BLD: 83 % (ref 43–78)
NITRITE UR QL STRIP.AUTO: POSITIVE
NRBC # BLD: 0 K/UL (ref 0–0.2)
OTHER OBSERVATIONS: NORMAL
PH UR STRIP: 8.5 (ref 5–9)
PLATELET # BLD AUTO: 346 K/UL (ref 150–450)
PLATELET COMMENT: ADEQUATE
PMV BLD AUTO: 8.7 FL (ref 9.4–12.3)
POTASSIUM SERPL-SCNC: 4.2 MMOL/L (ref 3.5–5.1)
PROCALCITONIN SERPL-MCNC: <0.05 NG/ML (ref 0–0.49)
PROT SERPL-MCNC: 7 G/DL (ref 6.3–8.2)
PROT UR STRIP-MCNC: NEGATIVE MG/DL
RBC # BLD AUTO: 4.17 M/UL (ref 4.05–5.2)
RBC #/AREA URNS HPF: 0 /HPF
RBC MORPH BLD: ABNORMAL
SODIUM SERPL-SCNC: 140 MMOL/L (ref 133–143)
SP GR UR REFRACTOMETRY: 1.02 (ref 1–1.02)
UROBILINOGEN UR QL STRIP.AUTO: 0.2 EU/DL (ref 0.2–1)
WBC # BLD AUTO: 19.3 K/UL (ref 4.3–11.1)
WBC MORPH BLD: ABNORMAL
WBC URNS QL MICRO: NORMAL /HPF

## 2023-01-10 PROCEDURE — 87040 BLOOD CULTURE FOR BACTERIA: CPT

## 2023-01-10 PROCEDURE — 81015 MICROSCOPIC EXAM OF URINE: CPT

## 2023-01-10 PROCEDURE — 71045 X-RAY EXAM CHEST 1 VIEW: CPT

## 2023-01-10 PROCEDURE — 85025 COMPLETE CBC W/AUTO DIFF WBC: CPT

## 2023-01-10 PROCEDURE — 6360000002 HC RX W HCPCS: Performed by: EMERGENCY MEDICINE

## 2023-01-10 PROCEDURE — 80053 COMPREHEN METABOLIC PANEL: CPT

## 2023-01-10 PROCEDURE — 81001 URINALYSIS AUTO W/SCOPE: CPT

## 2023-01-10 PROCEDURE — 87186 SC STD MICRODIL/AGAR DIL: CPT

## 2023-01-10 PROCEDURE — 87088 URINE BACTERIA CULTURE: CPT

## 2023-01-10 PROCEDURE — 84145 PROCALCITONIN (PCT): CPT

## 2023-01-10 PROCEDURE — 51701 INSERT BLADDER CATHETER: CPT | Performed by: EMERGENCY MEDICINE

## 2023-01-10 PROCEDURE — 2580000003 HC RX 258: Performed by: EMERGENCY MEDICINE

## 2023-01-10 PROCEDURE — 83605 ASSAY OF LACTIC ACID: CPT

## 2023-01-10 PROCEDURE — 1100000000 HC RM PRIVATE

## 2023-01-10 PROCEDURE — 87086 URINE CULTURE/COLONY COUNT: CPT

## 2023-01-10 PROCEDURE — 99285 EMERGENCY DEPT VISIT HI MDM: CPT | Performed by: EMERGENCY MEDICINE

## 2023-01-10 PROCEDURE — 96360 HYDRATION IV INFUSION INIT: CPT | Performed by: EMERGENCY MEDICINE

## 2023-01-10 RX ORDER — 0.9 % SODIUM CHLORIDE 0.9 %
1000 INTRAVENOUS SOLUTION INTRAVENOUS ONCE
Status: COMPLETED | OUTPATIENT
Start: 2023-01-10 | End: 2023-01-10

## 2023-01-10 RX ORDER — 0.9 % SODIUM CHLORIDE 0.9 %
1000 INTRAVENOUS SOLUTION INTRAVENOUS
Status: COMPLETED | OUTPATIENT
Start: 2023-01-10 | End: 2023-01-10

## 2023-01-10 RX ADMIN — SODIUM CHLORIDE 1000 ML: 9 INJECTION, SOLUTION INTRAVENOUS at 16:42

## 2023-01-10 RX ADMIN — SODIUM CHLORIDE 1000 ML: 9 INJECTION, SOLUTION INTRAVENOUS at 21:44

## 2023-01-10 RX ADMIN — CEFTRIAXONE 1000 MG: 1 INJECTION, POWDER, FOR SOLUTION INTRAMUSCULAR; INTRAVENOUS at 21:45

## 2023-01-10 ASSESSMENT — PAIN SCALES - WONG BAKER: WONGBAKER_NUMERICALRESPONSE: 0

## 2023-01-10 ASSESSMENT — PAIN - FUNCTIONAL ASSESSMENT: PAIN_FUNCTIONAL_ASSESSMENT: WONG-BAKER FACES

## 2023-01-10 NOTE — ED PROVIDER NOTES
Emergency Department Provider Note                   PCP:                Yogesh Sanchez               Age: 81 y.o. Sex: female       ICD-10-CM    1. Transient alteration of awareness  R40.4       2. Leukocytosis, unspecified type  D72.829           DISPOSITION          Medical Decision Making  74-year-old female with history of advanced dementia presents with 15 to 20 seconds of altered mental state with no postictal period or evidence of tonic-clonic activity. Exam nonfocal, and similar presentation a couple of weeks ago with negative work-up. On this presentation, the patient does have an elevated white blood cell count, with no other symptoms. Awaiting UA and chest x-ray results at time of shift change. Care will be turned over to Dr. Leeroy Hernandez at this time. Differential diagnosis at time of presentation includes but is not limited to seizure, sepsis, CVA, UTI, pneumonia, dehydration/hypovolemia. Patient hydrated in the department, orthostatics negative, no evidence of focal deficit. Amount and/or Complexity of Data Reviewed  Labs: ordered. Radiology: ordered. Risk  Prescription drug management. Orders Placed This Encounter   Procedures    XR CHEST PORTABLE    CBC with Auto Differential    Comprehensive Metabolic Panel    Urinalysis    Lactic Acid    Procalcitonin    Straight cath    Insert peripheral IV        Medications   0.9 % sodium chloride bolus (1,000 mLs IntraVENous New Bag 1/10/23 1388)       New Prescriptions    No medications on file        Brunilda Torres is a 81 y.o. female who presents to the Emergency Department with chief complaint of    Chief Complaint   Patient presents with    Seizures      74-year-old  female with history of Alzheimer's type dementia, anemia, recurrent UTIs, hypomagnesemia, and COPD presents to the emergency department with her daughter with possible seizure activity at home.   According the daughter, they were seen here in the emergency department a couple of weeks ago when the patient had about 1 minute total of altered mental state with irregular breathing after an episode of exertion. Patient had no significant postictal period and was back to her normal self afterwards. CT head at that time was negative as well as lab work. Today daughter states the patient had walked across the room before sitting down, and was noted to be a bit winded when she had another 15-second episode of decreased responsiveness and slight irregular breathing. Subsequent to the episode the patient was back to her normal self, with no obvious postictal episode and no evidence of tonic-clonic movement. Patient has no complaints, and cannot tell me where she is right now. The history is provided by the patient and a relative. The history is limited by the condition of the patient. Review of Systems   Unable to perform ROS: Dementia     Past Medical History:   Diagnosis Date    Anxiety     Depression         Past Surgical History:   Procedure Laterality Date    APPENDECTOMY          Family History   Problem Relation Age of Onset    Breast Cancer Neg Hx         Social History     Socioeconomic History    Marital status:    Tobacco Use    Smoking status: Every Day     Packs/day: 1.00     Types: Cigarettes    Smokeless tobacco: Never         Patient has no known allergies. Previous Medications    ESCITALOPRAM (LEXAPRO) 10 MG TABLET    Take 10 mg by mouth daily    UMECLIDINIUM-VILANTEROL (ANORO ELLIPTA) 62.5-25 MCG/INH AEPB INHALER    Inhale 1 puff into the lungs daily        Vitals signs and nursing note reviewed. No data found. Physical Exam  Vitals and nursing note reviewed. Constitutional:       General: She is not in acute distress. HENT:      Head: Normocephalic and atraumatic.       Nose: Nose normal.      Mouth/Throat:      Mouth: Mucous membranes are moist.   Eyes:      Extraocular Movements: Extraocular movements intact. Conjunctiva/sclera: Conjunctivae normal.      Pupils: Pupils are equal, round, and reactive to light. Cardiovascular:      Rate and Rhythm: Normal rate and regular rhythm. Heart sounds: No murmur heard. Pulmonary:      Effort: Pulmonary effort is normal.      Breath sounds: Normal breath sounds. Abdominal:      General: Abdomen is flat. Palpations: There is no mass. Tenderness: There is no abdominal tenderness. There is no right CVA tenderness or left CVA tenderness. Musculoskeletal:         General: Normal range of motion. Cervical back: Normal range of motion and neck supple. Skin:     General: Skin is warm and dry. Neurological:      General: No focal deficit present. Mental Status: She is alert. She is disoriented. Cranial Nerves: Cranial nerves 2-12 are intact. Sensory: Sensation is intact. Motor: Motor function is intact. Psychiatric:         Mood and Affect: Mood and affect normal.         Behavior: Behavior is cooperative. Thought Content: Thought content is not paranoid or delusional.         Cognition and Memory: Memory is impaired.         Procedures      Results Reviewed:      Recent Results (from the past 24 hour(s))   CBC with Auto Differential    Collection Time: 01/10/23  3:33 PM   Result Value Ref Range    WBC 19.3 (H) 4.3 - 11.1 K/uL    RBC 4.17 4.05 - 5.2 M/uL    Hemoglobin 12.2 11.7 - 15.4 g/dL    Hematocrit 36.0 35.8 - 46.3 %    MCV 86.3 82 - 102 FL    MCH 29.3 26.1 - 32.9 PG    MCHC 33.9 31.4 - 35.0 g/dL    RDW 11.7 (L) 11.9 - 14.6 %    Platelets 704 650 - 285 K/uL    MPV 8.7 (L) 9.4 - 12.3 FL    nRBC 0.00 0.0 - 0.2 K/uL    Seg Neutrophils 83 (H) 43 - 78 %    Lymphocytes 11 (L) 13 - 44 %    Monocytes 5 4.0 - 12.0 %    Eosinophils % 0 (L) 0.5 - 7.8 %    Basophils 0 0.0 - 2.0 %    Immature Granulocytes 1 0.0 - 5.0 %    Segs Absolute 16.0 (H) 1.7 - 8.2 K/UL    Absolute Lymph # 2.1 0.5 - 4.6 K/UL    Absolute Mono # 1.0 0.1 - 1.3 K/UL    Absolute Eos # 0.0 0.0 - 0.8 K/UL    Basophils Absolute 0.0 0.0 - 0.2 K/UL    Absolute Immature Granulocyte 0.2 0.0 - 0.5 K/UL    RBC Comment NORMOCYTIC/NORMOCHROMIC      WBC Comment Result Confirmed By Smear      Platelet Comment ADEQUATE      Differential Type AUTOMATED     Comprehensive Metabolic Panel    Collection Time: 01/10/23  3:33 PM   Result Value Ref Range    Sodium 140 133 - 143 mmol/L    Potassium 4.2 3.5 - 5.1 mmol/L    Chloride 107 101 - 110 mmol/L    CO2 27 21 - 32 mmol/L    Anion Gap 6 2 - 11 mmol/L    Glucose 116 (H) 65 - 100 mg/dL    BUN 18 8 - 23 MG/DL    Creatinine 1.00 0.6 - 1.0 MG/DL    Est, Glom Filt Rate >60 >60 ml/min/1.73m2    Calcium 9.5 8.3 - 10.4 MG/DL    Total Bilirubin 1.1 0.2 - 1.1 MG/DL    ALT 31 12 - 65 U/L    AST 33 15 - 37 U/L    Alk Phosphatase 76 50 - 136 U/L    Total Protein 7.0 6.3 - 8.2 g/dL    Albumin 3.4 3.2 - 4.6 g/dL    Globulin 3.6 2.8 - 4.5 g/dL    Albumin/Globulin Ratio 0.9 0.4 - 1.6     Lactic Acid    Collection Time: 01/10/23  3:33 PM   Result Value Ref Range    Lactic Acid, Plasma 1.9 0.4 - 2.0 MMOL/L   Procalcitonin    Collection Time: 01/10/23  3:33 PM   Result Value Ref Range    Procalcitonin <0.05 0.00 - 0.49 ng/mL          Voice dictation software was used during the making of this note. This software is not perfect and grammatical and other typographical errors may be present. This note has not been completely proofread for errors.      Suha Huffman MD  01/10/23 0858

## 2023-01-10 NOTE — ED TRIAGE NOTES
Pt arrives via [facility] from home, called out for seizures. Per family, pt had a seizure today, lasted for about 15seconds, just dx with seizures last week. Pt presented as postictal after, lethargic, not alert. Per family, pt has pretty advanced dementia, pt is non-verbal at baseline. Per EMS, , 95% on room air, . EMS noted pt still had EKG stickers on her from her 12/26 hospital visit.

## 2023-01-11 ENCOUNTER — APPOINTMENT (OUTPATIENT)
Dept: CT IMAGING | Age: 73
DRG: 689 | End: 2023-01-11
Payer: MEDICARE

## 2023-01-11 LAB
ANION GAP SERPL CALC-SCNC: 5 MMOL/L (ref 2–11)
BASOPHILS # BLD: 0.1 K/UL (ref 0–0.2)
BASOPHILS NFR BLD: 0 % (ref 0–2)
BUN SERPL-MCNC: 15 MG/DL (ref 8–23)
CALCIUM SERPL-MCNC: 8.6 MG/DL (ref 8.3–10.4)
CHLORIDE SERPL-SCNC: 112 MMOL/L (ref 101–110)
CO2 SERPL-SCNC: 23 MMOL/L (ref 21–32)
CREAT SERPL-MCNC: 0.8 MG/DL (ref 0.6–1)
DIFFERENTIAL METHOD BLD: ABNORMAL
EOSINOPHIL # BLD: 0.1 K/UL (ref 0–0.8)
EOSINOPHIL NFR BLD: 1 % (ref 0.5–7.8)
ERYTHROCYTE [DISTWIDTH] IN BLOOD BY AUTOMATED COUNT: 11.7 % (ref 11.9–14.6)
GLUCOSE SERPL-MCNC: 115 MG/DL (ref 65–100)
HCT VFR BLD AUTO: 33.3 % (ref 35.8–46.3)
HGB BLD-MCNC: 10.8 G/DL (ref 11.7–15.4)
IMM GRANULOCYTES # BLD AUTO: 0.1 K/UL (ref 0–0.5)
IMM GRANULOCYTES NFR BLD AUTO: 0 % (ref 0–5)
LYMPHOCYTES # BLD: 2.4 K/UL (ref 0.5–4.6)
LYMPHOCYTES NFR BLD: 20 % (ref 13–44)
MCH RBC QN AUTO: 29.1 PG (ref 26.1–32.9)
MCHC RBC AUTO-ENTMCNC: 32.4 G/DL (ref 31.4–35)
MCV RBC AUTO: 89.8 FL (ref 82–102)
MONOCYTES # BLD: 1.2 K/UL (ref 0.1–1.3)
MONOCYTES NFR BLD: 10 % (ref 4–12)
NEUTS SEG # BLD: 8.1 K/UL (ref 1.7–8.2)
NEUTS SEG NFR BLD: 69 % (ref 43–78)
NRBC # BLD: 0 K/UL (ref 0–0.2)
PLATELET # BLD AUTO: 280 K/UL (ref 150–450)
PMV BLD AUTO: 8.6 FL (ref 9.4–12.3)
POTASSIUM SERPL-SCNC: 3.7 MMOL/L (ref 3.5–5.1)
RBC # BLD AUTO: 3.71 M/UL (ref 4.05–5.2)
SODIUM SERPL-SCNC: 140 MMOL/L (ref 133–143)
WBC # BLD AUTO: 11.9 K/UL (ref 4.3–11.1)

## 2023-01-11 PROCEDURE — 6370000000 HC RX 637 (ALT 250 FOR IP): Performed by: INTERNAL MEDICINE

## 2023-01-11 PROCEDURE — 6360000002 HC RX W HCPCS: Performed by: HOSPITALIST

## 2023-01-11 PROCEDURE — 80048 BASIC METABOLIC PNL TOTAL CA: CPT

## 2023-01-11 PROCEDURE — 6370000000 HC RX 637 (ALT 250 FOR IP): Performed by: HOSPITALIST

## 2023-01-11 PROCEDURE — 2580000003 HC RX 258: Performed by: HOSPITALIST

## 2023-01-11 PROCEDURE — 70450 CT HEAD/BRAIN W/O DYE: CPT

## 2023-01-11 PROCEDURE — 1100000000 HC RM PRIVATE

## 2023-01-11 PROCEDURE — 6360000002 HC RX W HCPCS: Performed by: INTERNAL MEDICINE

## 2023-01-11 PROCEDURE — 95816 EEG AWAKE AND DROWSY: CPT

## 2023-01-11 PROCEDURE — 85025 COMPLETE CBC W/AUTO DIFF WBC: CPT

## 2023-01-11 PROCEDURE — 36415 COLL VENOUS BLD VENIPUNCTURE: CPT

## 2023-01-11 RX ORDER — SODIUM CHLORIDE 9 MG/ML
INJECTION, SOLUTION INTRAVENOUS PRN
Status: DISCONTINUED | OUTPATIENT
Start: 2023-01-11 | End: 2023-01-13 | Stop reason: HOSPADM

## 2023-01-11 RX ORDER — POLYETHYLENE GLYCOL 3350 17 G/17G
17 POWDER, FOR SOLUTION ORAL DAILY PRN
Status: DISCONTINUED | OUTPATIENT
Start: 2023-01-11 | End: 2023-01-13 | Stop reason: HOSPADM

## 2023-01-11 RX ORDER — DIPHENHYDRAMINE HYDROCHLORIDE 50 MG/ML
50 INJECTION INTRAMUSCULAR; INTRAVENOUS
Status: COMPLETED | OUTPATIENT
Start: 2023-01-11 | End: 2023-01-11

## 2023-01-11 RX ORDER — SODIUM CHLORIDE 0.9 % (FLUSH) 0.9 %
5-40 SYRINGE (ML) INJECTION PRN
Status: DISCONTINUED | OUTPATIENT
Start: 2023-01-11 | End: 2023-01-13 | Stop reason: HOSPADM

## 2023-01-11 RX ORDER — MAGNESIUM HYDROXIDE/ALUMINUM HYDROXICE/SIMETHICONE 120; 1200; 1200 MG/30ML; MG/30ML; MG/30ML
30 SUSPENSION ORAL EVERY 6 HOURS PRN
Status: DISCONTINUED | OUTPATIENT
Start: 2023-01-11 | End: 2023-01-13 | Stop reason: HOSPADM

## 2023-01-11 RX ORDER — ESCITALOPRAM OXALATE 10 MG/1
10 TABLET ORAL DAILY
Status: DISCONTINUED | OUTPATIENT
Start: 2023-01-11 | End: 2023-01-13 | Stop reason: HOSPADM

## 2023-01-11 RX ORDER — SODIUM CHLORIDE 9 MG/ML
INJECTION, SOLUTION INTRAVENOUS CONTINUOUS
Status: ACTIVE | OUTPATIENT
Start: 2023-01-11 | End: 2023-01-12

## 2023-01-11 RX ORDER — ACETAMINOPHEN 650 MG/1
650 SUPPOSITORY RECTAL EVERY 6 HOURS PRN
Status: DISCONTINUED | OUTPATIENT
Start: 2023-01-11 | End: 2023-01-13 | Stop reason: HOSPADM

## 2023-01-11 RX ORDER — ENOXAPARIN SODIUM 100 MG/ML
40 INJECTION SUBCUTANEOUS DAILY
Status: DISCONTINUED | OUTPATIENT
Start: 2023-01-11 | End: 2023-01-13 | Stop reason: HOSPADM

## 2023-01-11 RX ORDER — SODIUM CHLORIDE 0.9 % (FLUSH) 0.9 %
5-40 SYRINGE (ML) INJECTION EVERY 12 HOURS SCHEDULED
Status: DISCONTINUED | OUTPATIENT
Start: 2023-01-11 | End: 2023-01-13 | Stop reason: HOSPADM

## 2023-01-11 RX ORDER — ONDANSETRON 4 MG/1
4 TABLET, ORALLY DISINTEGRATING ORAL EVERY 8 HOURS PRN
Status: DISCONTINUED | OUTPATIENT
Start: 2023-01-11 | End: 2023-01-13 | Stop reason: HOSPADM

## 2023-01-11 RX ORDER — NYSTATIN 100000 U/G
CREAM TOPICAL 2 TIMES DAILY
Status: DISCONTINUED | OUTPATIENT
Start: 2023-01-11 | End: 2023-01-13 | Stop reason: HOSPADM

## 2023-01-11 RX ORDER — ACETAMINOPHEN 325 MG/1
650 TABLET ORAL EVERY 6 HOURS PRN
Status: DISCONTINUED | OUTPATIENT
Start: 2023-01-11 | End: 2023-01-13 | Stop reason: HOSPADM

## 2023-01-11 RX ORDER — ONDANSETRON 2 MG/ML
4 INJECTION INTRAMUSCULAR; INTRAVENOUS EVERY 6 HOURS PRN
Status: DISCONTINUED | OUTPATIENT
Start: 2023-01-11 | End: 2023-01-13 | Stop reason: HOSPADM

## 2023-01-11 RX ADMIN — CEFTRIAXONE 1000 MG: 1 INJECTION, POWDER, FOR SOLUTION INTRAMUSCULAR; INTRAVENOUS at 22:54

## 2023-01-11 RX ADMIN — DIPHENHYDRAMINE HYDROCHLORIDE 50 MG: 50 INJECTION, SOLUTION INTRAMUSCULAR; INTRAVENOUS at 17:03

## 2023-01-11 RX ADMIN — SODIUM CHLORIDE: 9 INJECTION, SOLUTION INTRAVENOUS at 00:36

## 2023-01-11 RX ADMIN — ACETAMINOPHEN 650 MG: 325 TABLET ORAL at 20:40

## 2023-01-11 RX ADMIN — NYSTATIN: 100000 CREAM TOPICAL at 20:41

## 2023-01-11 RX ADMIN — SODIUM CHLORIDE: 9 INJECTION, SOLUTION INTRAVENOUS at 14:45

## 2023-01-11 NOTE — PROGRESS NOTES
Progress Note    Patient: Dunia Song MRN: [id number]  SSN: [id number]    YOB: 1950  Age: 81 y.o. Sex: female      Admit Date: 1/10/2023    LOS: 1 day     Assessment and Plan:   70-year-old female with medical history of advanced dementia, currently with palliative care, now presented in setting of unresponsiveness and possible seizure    1. Acute metabolic encephalopathy-concerns of seizures  -Obtain CT of the brain  -Seizure precautions  -Obtain EEG  -Monitor mental status  -Avoid sedatives  -Delirium precautions    2. Urinary tract infection  -Continue IV ceftriaxone  -Follow urine culture    3. Advanced dementia  -Delirium precautions    DVT prophylaxis with Lovenox    Subjective:   70-year-old female with medical history of advanced dementia, currently with palliative care, now presented in setting of unresponsiveness and possible seizure. Patient seen and examined at bedside. This morning patient somnolent and unable to obtain any detailed history. Objective:     Vitals:    01/11/23 0745 01/11/23 0830 01/11/23 1030 01/11/23 1130   BP:  116/72 (!) 132/105    Pulse:       Resp:       Temp:       TempSrc:       SpO2: (!) 88% 92% 94% 93%        Intake and Output:  Current Shift: No intake/output data recorded. Last three shifts: No intake/output data recorded.     ROS  Unable to obtain detailed history    Physical Exam:   General: Somnolent, NAD  HEENT: NC/AT, EOM are intact  Neck: supple, no JVD  Cardiovascular: RRR, S1, S2, no murmurs  Respiratory: Lungs are clear, no wheezes or rales  Abdomen: Soft, NT, ND  Back: No CVA tenderness, no paraspinal tenderness  Extremities: LE without pedal edema, no erythema  Neuro: Somnolent, moving all extremities  Skin: no rash or ulcers    Lab/Data Review:  I have personally reviewed patients laboratory data showing  Recent Results (from the past 24 hour(s))   Culture, Urine    Collection Time: 01/10/23  3:32 PM    Specimen: Urine, clean catch Result Value Ref Range    Special Requests NO SPECIAL REQUESTS      Culture        No growth after short period of incubation. Further results to follow after overnight incubation.    CBC with Auto Differential    Collection Time: 01/10/23  3:33 PM   Result Value Ref Range    WBC 19.3 (H) 4.3 - 11.1 K/uL    RBC 4.17 4.05 - 5.2 M/uL    Hemoglobin 12.2 11.7 - 15.4 g/dL    Hematocrit 36.0 35.8 - 46.3 %    MCV 86.3 82 - 102 FL    MCH 29.3 26.1 - 32.9 PG    MCHC 33.9 31.4 - 35.0 g/dL    RDW 11.7 (L) 11.9 - 14.6 %    Platelets 915 895 - 854 K/uL    MPV 8.7 (L) 9.4 - 12.3 FL    nRBC 0.00 0.0 - 0.2 K/uL    Seg Neutrophils 83 (H) 43 - 78 %    Lymphocytes 11 (L) 13 - 44 %    Monocytes 5 4.0 - 12.0 %    Eosinophils % 0 (L) 0.5 - 7.8 %    Basophils 0 0.0 - 2.0 %    Immature Granulocytes 1 0.0 - 5.0 %    Segs Absolute 16.0 (H) 1.7 - 8.2 K/UL    Absolute Lymph # 2.1 0.5 - 4.6 K/UL    Absolute Mono # 1.0 0.1 - 1.3 K/UL    Absolute Eos # 0.0 0.0 - 0.8 K/UL    Basophils Absolute 0.0 0.0 - 0.2 K/UL    Absolute Immature Granulocyte 0.2 0.0 - 0.5 K/UL    RBC Comment NORMOCYTIC/NORMOCHROMIC      WBC Comment Result Confirmed By Smear      Platelet Comment ADEQUATE      Differential Type AUTOMATED     Comprehensive Metabolic Panel    Collection Time: 01/10/23  3:33 PM   Result Value Ref Range    Sodium 140 133 - 143 mmol/L    Potassium 4.2 3.5 - 5.1 mmol/L    Chloride 107 101 - 110 mmol/L    CO2 27 21 - 32 mmol/L    Anion Gap 6 2 - 11 mmol/L    Glucose 116 (H) 65 - 100 mg/dL    BUN 18 8 - 23 MG/DL    Creatinine 1.00 0.6 - 1.0 MG/DL    Est, Glom Filt Rate >60 >60 ml/min/1.73m2    Calcium 9.5 8.3 - 10.4 MG/DL    Total Bilirubin 1.1 0.2 - 1.1 MG/DL    ALT 31 12 - 65 U/L    AST 33 15 - 37 U/L    Alk Phosphatase 76 50 - 136 U/L    Total Protein 7.0 6.3 - 8.2 g/dL    Albumin 3.4 3.2 - 4.6 g/dL    Globulin 3.6 2.8 - 4.5 g/dL    Albumin/Globulin Ratio 0.9 0.4 - 1.6     Urinalysis    Collection Time: 01/10/23  3:33 PM   Result Value Ref Range Color, UA YELLOW/STRAW      Appearance CLEAR      Specific Gravity, UA 1.020 1.001 - 1.023      pH, Urine 8.5 5.0 - 9.0      Protein, UA Negative NEG mg/dL    Glucose, UA Negative mg/dL    Ketones, Urine Negative NEG mg/dL    Bilirubin Urine Negative NEG      Blood, Urine Negative NEG      Urobilinogen, Urine 0.2 0.2 - 1.0 EU/dL    Nitrite, Urine Positive (A) NEG      Leukocyte Esterase, Urine SMALL (A) NEG     Lactic Acid    Collection Time: 01/10/23  3:33 PM   Result Value Ref Range    Lactic Acid, Plasma 1.9 0.4 - 2.0 MMOL/L   Procalcitonin    Collection Time: 01/10/23  3:33 PM   Result Value Ref Range    Procalcitonin <0.05 0.00 - 0.49 ng/mL   Urinalysis, Micro    Collection Time: 01/10/23  3:33 PM   Result Value Ref Range    WBC, UA 10-20 0 /hpf    RBC, UA 0 0 /hpf    Epithelial Cells UA 0 0 /hpf    BACTERIA, URINE TRACE 0 /hpf    Casts 0 0 /lpf    Crystals 0 0 /LPF    Mucus, UA 0 0 /lpf    Other observations RESULTS VERIFIED MANUALLY     Blood Culture 1    Collection Time: 01/10/23  7:31 PM    Specimen: Blood   Result Value Ref Range    Special Requests RIGHT  Antecubital        Culture NO GROWTH AFTER 11 HOURS     Lactate, Sepsis    Collection Time: 01/10/23  7:31 PM   Result Value Ref Range    Lactic Acid, Sepsis 1.6 0.4 - 2.0 MMOL/L   Lactate, Sepsis    Collection Time: 01/10/23  9:36 PM   Result Value Ref Range    Lactic Acid, Sepsis 0.9 0.4 - 2.0 MMOL/L   Blood Culture 2    Collection Time: 01/10/23  9:43 PM    Specimen: Blood   Result Value Ref Range    Special Requests LEFT  Antecubital        Culture NO GROWTH AFTER 9 HOURS     Basic Metabolic Panel w/ Reflex to MG    Collection Time: 01/11/23  7:11 AM   Result Value Ref Range    Sodium 140 133 - 143 mmol/L    Potassium 3.7 3.5 - 5.1 mmol/L    Chloride 112 (H) 101 - 110 mmol/L    CO2 23 21 - 32 mmol/L    Anion Gap 5 2 - 11 mmol/L    Glucose 115 (H) 65 - 100 mg/dL    BUN 15 8 - 23 MG/DL    Creatinine 0.80 0.6 - 1.0 MG/DL    Est, Glom Filt Rate >60 >60 ml/min/1.73m2    Calcium 8.6 8.3 - 10.4 MG/DL   CBC with Auto Differential    Collection Time: 01/11/23  7:11 AM   Result Value Ref Range    WBC 11.9 (H) 4.3 - 11.1 K/uL    RBC 3.71 (L) 4.05 - 5.2 M/uL    Hemoglobin 10.8 (L) 11.7 - 15.4 g/dL    Hematocrit 33.3 (L) 35.8 - 46.3 %    MCV 89.8 82 - 102 FL    MCH 29.1 26.1 - 32.9 PG    MCHC 32.4 31.4 - 35.0 g/dL    RDW 11.7 (L) 11.9 - 14.6 %    Platelets 567 693 - 433 K/uL    MPV 8.6 (L) 9.4 - 12.3 FL    nRBC 0.00 0.0 - 0.2 K/uL    Differential Type AUTOMATED      Seg Neutrophils 69 43 - 78 %    Lymphocytes 20 13 - 44 %    Monocytes 10 4.0 - 12.0 %    Eosinophils % 1 0.5 - 7.8 %    Basophils 0 0.0 - 2.0 %    Immature Granulocytes 0 0.0 - 5.0 %    Segs Absolute 8.1 1.7 - 8.2 K/UL    Absolute Lymph # 2.4 0.5 - 4.6 K/UL    Absolute Mono # 1.2 0.1 - 1.3 K/UL    Absolute Eos # 0.1 0.0 - 0.8 K/UL    Basophils Absolute 0.1 0.0 - 0.2 K/UL    Absolute Immature Granulocyte 0.1 0.0 - 0.5 K/UL        Image:  I have personally reviewed patients imaging showing  CT HEAD WO CONTRAST   Final Result   1. No acute intracranial abnormality. The left and chronic microvascular   ischemic change. 2. Right maxillary sinus and thickening possibly sinusitis in the correct   clinical setting. This represents a change from the prior head CT.          XR CHEST PORTABLE   Final Result           Current Facility-Administered Medications   Medication Dose Route Frequency Provider Last Rate Last Admin    sodium chloride flush 0.9 % injection 5-40 mL  5-40 mL IntraVENous 2 times per day Burnett Osgood, MD        sodium chloride flush 0.9 % injection 5-40 mL  5-40 mL IntraVENous PRN Burnett Osgood, MD        0.9 % sodium chloride infusion   IntraVENous PRN Burnett Osgood, MD        ondansetron (ZOFRAN-ODT) disintegrating tablet 4 mg  4 mg Oral Q8H PRN Burnett Osgood, MD        Or    ondansetron [facility]) injection 4 mg  4 mg IntraVENous Q6H PRN Burnett Osgood, MD        polyethylene glycol [facility]) packet 17 g 17 g Oral Daily PRN Conor Ron MD        aluminum & magnesium hydroxide-simethicone (MAALOX) 200-200-20 MG/5ML suspension 30 mL  30 mL Oral Q6H PRN Conor Ron MD        acetaminophen (TYLENOL) tablet 650 mg  650 mg Oral Q6H PRN Conor Ron MD        Or    acetaminophen (TYLENOL) suppository 650 mg  650 mg Rectal Q6H PRN Conor Ron MD        0.9 % sodium chloride infusion   IntraVENous Continuous Conor Ron MD 75 mL/hr at 01/11/23 0036 New Bag at 01/11/23 0036    enoxaparin (LOVENOX) injection 40 mg  40 mg SubCUTAneous Daily Conor Ron MD        cefTRIAXone (ROCEPHIN) 1,000 mg in sodium chloride 0.9 % 50 mL IVPB mini-bag  1,000 mg IntraVENous Q24H Conor Ron MD        escitalopram (LEXAPRO) tablet 10 mg  10 mg Oral Daily Conor Ron MD        tiotropium-olodaterol (STIOLTO) 2.5-2.5 MCG/ACT inhaler 2 puff  2 puff Inhalation Daily Conor Ron MD         Current Outpatient Medications   Medication Sig Dispense Refill    escitalopram (LEXAPRO) 10 MG tablet Take 10 mg by mouth daily      umeclidinium-vilanterol (ANORO ELLIPTA) 62.5-25 MCG/INH AEPB inhaler Inhale 1 puff into the lungs daily          Hospital problems     Patient Active Problem List   Diagnosis    Hyperglycemia    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        Signed By: Radha Cotter MD     January 11, 2023

## 2023-01-11 NOTE — ED NOTES
Received handoff from Dr. Peggy Portillo. Patient is a 57-year-old female with history of dementia who presents w/ daughter w/ c/o 15 to 20 seconds of altered mental state with no postictal period or evidence of tonic-clonic activity. Daughter states the patient had similar episode back in December when she was initially diagnosed with COVID-19. CT head at that time negative for acute intracranial abnormality noted. Severe bilateral white matter hypoattenuation most in keeping with microangiopathic disease with moderate cerebral atrophy was noted. Work-up thus far with evidence of leukocytosis with white blood cell count of 19.3. Instructed to follow-up on UA and chest x-ray. Ultimately chest x-ray negative for infiltrate or consolidation. UA nitrite positive with small LE. Patient demented at baseline but daughter states increased confusion. Will obtain blood cultures, lactic acid. Urine culture added on. Rocephin 1 g IV ordered. Will consult hospitalist for admission.        Hellen Harrington MD  01/10/23 1922

## 2023-01-11 NOTE — H&P
Hospitalist History and Physical   Admit Date:  1/10/2023 12:01 PM   Name:  Shad Rodriguez   Age:  81 y.o. Sex:  female  :  1950   MRN:  [id number]   Room:  ER    Presenting Complaint: Seizures     Reason(s) for Admission: UTI, infectious encephalopathy, meet sepsis criteria. Assessment & Plan:     Principal Problem:    Infectious encephalopathy -32-year-old with dementia, here with increased confusion, found to have UTI, meeting sepsis criteria with white blood cell count 19 and tachycardia, pulse 107. Plan: Will admit to medical bed, inpatient status  Continue antibiotics and IV fluids for her urinary tract infection. Follow-up urine cultures and blood cultures obtained in the emergency room. Review of chart shows that patient is a DO NOT RESUSCITATE. This was confirmed with daughter who is at bedside. Resume home medications as appropriate. Daughter states that she has been refusing her inhaler and what ever meds she takes has to be crushed with applesauce. High likelihood of sundowning given her dementia. Daughter states that she does get very agitated and confused in house. 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 female with medical history dementia, current with hospice and palliative care, who presents w/ daughter w/ c/o 15 to 20 seconds of altered mental state with no postictal period or evidence of tonic-clonic activity. Daughter states the patient had similar episode back in December when she was initially diagnosed with COVID-19. CT head at that time 22 negative for acute intracranial abnormality noted.   Severe bilateral white matter hypoattenuation most in keeping with microangiopathic disease with moderate cerebral atrophy was noted. Work-up thus far tonight with evidence of leukocytosis white blood cell count of 19.3. Chest x-ray negative for infiltrate or consolidation. UA nitrite positive with small LE, 10-20 WBC. Patient demented at baseline but daughter states increased confusion. lactic acid 1.6. Urine culture added on. Rocephin 1 g IV ordered. Will consult hospitalist for admission. Daughter states that patient does get very confused in the hospital.    Review of Systems:  10 systems reviewed and negative except as noted in HPI.     Past History:     Past Medical History:   Diagnosis Date    Anxiety     COPD (chronic obstructive pulmonary disease) (HCC)     Dementia (HCC)     Depression     Hyperlipidemia        Past Surgical History:   Procedure Laterality Date    APPENDECTOMY          Social History     Tobacco Use    Smoking status: Every Day     Packs/day: 1.00     Types: Cigarettes    Smokeless tobacco: Never   Substance Use Topics    Alcohol use: Not on file      Social History     Substance and Sexual Activity   Drug Use Not on file       Family History   Problem Relation Age of Onset    Breast Cancer Neg Hx         Immunization History   Administered Date(s) Administered    COVID-19, MODERNA BLUE border, Primary or Immunocompromised, (age 12y+), IM, 100 mcg/0.5mL 03/10/2021, 04/28/2021, 12/31/2021    Influenza Virus Vaccine 11/01/2016    Pneumococcal Polysaccharide ([id number]) 11/01/2016    Tst, Unspecified Formulation 01/01/1982     No Known Allergies  Prior to Admit Medications:  Current Outpatient Medications   Medication Instructions    escitalopram (LEXAPRO) 10 mg, Oral, DAILY    umeclidinium-vilanterol (ANORO ELLIPTA) 62.5-25 MCG/INH AEPB inhaler 1 puff, Inhalation, DAILY         Objective:   Patient Vitals for the past 24 hrs:   Temp Pulse Resp BP SpO2   01/10/23 1205 98.9 °F (37.2 °C) (!) 107 17 102/72 94 %       Oxygen Therapy  SpO2: 94 %  Pulse Oximeter Device Mode: Intermittent  O2 Device: None (Room air)    There is no height or weight on file to calculate BMI. No intake or output data in the 24 hours ending 01/10/23 2112      Physical Exam:    Blood pressure 102/72, pulse (!) 107, temperature 98.9 °F (37.2 °C), temperature source Axillary, resp. rate 17, SpO2 94 %. General:    Frail, cachectic, awake and alert, oriented x0. Head:  Normocephalic, atraumatic  Eyes:  Sclerae appear normal.  Pupils equally round. ENT:  Nares appear normal, no drainage. Moist oral mucosa  Neck:  No restricted ROM. Trachea midline   CV:   Tachycardic, regular. No m/r/g. No jugular venous distension. Lungs:   CTAB. No wheezing, rhonchi, or rales. Symmetric expansion. Abdomen: Bowel sounds present. Soft, nontender, nondistended. Extremities: No cyanosis or clubbing. No edema  Skin:     No rashes and normal coloration. Warm and dry. Neuro:  CN II-XII grossly intact. Sensation intact. A&Ox0  Psych:  Normal mood and affect.       I have personally reviewed labs and tests showing:  Recent Labs:  Recent Results (from the past 24 hour(s))   CBC with Auto Differential    Collection Time: 01/10/23  3:33 PM   Result Value Ref Range    WBC 19.3 (H) 4.3 - 11.1 K/uL    RBC 4.17 4.05 - 5.2 M/uL    Hemoglobin 12.2 11.7 - 15.4 g/dL    Hematocrit 36.0 35.8 - 46.3 %    MCV 86.3 82 - 102 FL    MCH 29.3 26.1 - 32.9 PG    MCHC 33.9 31.4 - 35.0 g/dL    RDW 11.7 (L) 11.9 - 14.6 %    Platelets 905 566 - 404 K/uL    MPV 8.7 (L) 9.4 - 12.3 FL    nRBC 0.00 0.0 - 0.2 K/uL    Seg Neutrophils 83 (H) 43 - 78 %    Lymphocytes 11 (L) 13 - 44 %    Monocytes 5 4.0 - 12.0 %    Eosinophils % 0 (L) 0.5 - 7.8 %    Basophils 0 0.0 - 2.0 %    Immature Granulocytes 1 0.0 - 5.0 %    Segs Absolute 16.0 (H) 1.7 - 8.2 K/UL    Absolute Lymph # 2.1 0.5 - 4.6 K/UL    Absolute Mono # 1.0 0.1 - 1.3 K/UL    Absolute Eos # 0.0 0.0 - 0.8 K/UL    Basophils Absolute 0.0 0.0 - 0.2 K/UL    Absolute Immature Granulocyte 0.2 0.0 - 0.5 K/UL    RBC Comment NORMOCYTIC/NORMOCHROMIC      WBC Comment Result Confirmed By Smear      Platelet Comment ADEQUATE      Differential Type AUTOMATED     Comprehensive Metabolic Panel    Collection Time: 01/10/23  3:33 PM   Result Value Ref Range    Sodium 140 133 - 143 mmol/L    Potassium 4.2 3.5 - 5.1 mmol/L    Chloride 107 101 - 110 mmol/L    CO2 27 21 - 32 mmol/L    Anion Gap 6 2 - 11 mmol/L    Glucose 116 (H) 65 - 100 mg/dL    BUN 18 8 - 23 MG/DL    Creatinine 1.00 0.6 - 1.0 MG/DL    Est, Glom Filt Rate >60 >60 ml/min/1.73m2    Calcium 9.5 8.3 - 10.4 MG/DL    Total Bilirubin 1.1 0.2 - 1.1 MG/DL    ALT 31 12 - 65 U/L    AST 33 15 - 37 U/L    Alk Phosphatase 76 50 - 136 U/L    Total Protein 7.0 6.3 - 8.2 g/dL    Albumin 3.4 3.2 - 4.6 g/dL    Globulin 3.6 2.8 - 4.5 g/dL    Albumin/Globulin Ratio 0.9 0.4 - 1.6     Urinalysis    Collection Time: 01/10/23  3:33 PM   Result Value Ref Range    Color, UA YELLOW/STRAW      Appearance CLEAR      Specific Gravity, UA 1.020 1.001 - 1.023      pH, Urine 8.5 5.0 - 9.0      Protein, UA Negative NEG mg/dL    Glucose, UA Negative mg/dL    Ketones, Urine Negative NEG mg/dL    Bilirubin Urine Negative NEG      Blood, Urine Negative NEG      Urobilinogen, Urine 0.2 0.2 - 1.0 EU/dL    Nitrite, Urine Positive (A) NEG      Leukocyte Esterase, Urine SMALL (A) NEG     Lactic Acid    Collection Time: 01/10/23  3:33 PM   Result Value Ref Range    Lactic Acid, Plasma 1.9 0.4 - 2.0 MMOL/L   Procalcitonin    Collection Time: 01/10/23  3:33 PM   Result Value Ref Range    Procalcitonin <0.05 0.00 - 0.49 ng/mL   Urinalysis, Micro    Collection Time: 01/10/23  3:33 PM   Result Value Ref Range    WBC, UA 10-20 0 /hpf    RBC, UA 0 0 /hpf    Epithelial Cells UA 0 0 /hpf    BACTERIA, URINE TRACE 0 /hpf    Casts 0 0 /lpf    Crystals 0 0 /LPF    Mucus, UA 0 0 /lpf    Other observations RESULTS VERIFIED MANUALLY     Lactate, Sepsis    Collection Time: 01/10/23  7:31 PM   Result Value Ref Range Lactic Acid, Sepsis 1.6 0.4 - 2.0 MMOL/L       I have personally reviewed imaging studies showing:  XR CHEST PORTABLE    Result Date: 1/10/2023  History: Leukocytosis Exam: portable chest Comparison: 12/26/2022 Findings: The patient is rotated to the right. COPD changes noted. No focal alveolar infiltrate. No change in the appearance of the mediastinal contour or osseous structures. IMPRESSIONs: Stable portable chest       Echocardiogram:  No results found for this or any previous visit. Orders Placed This Encounter   Medications    0.9 % sodium chloride bolus    cefTRIAXone (ROCEPHIN) 1,000 mg in sodium chloride 0.9 % 50 mL IVPB mini-bag     Order Specific Question:   Antimicrobial Indications     Answer:   Urinary Tract Infection     Order Specific Question:   Antimicrobial Indications     Answer: Other     Order Specific Question:   Other Abx Indication     Answer:   Sepsis    0.9 % sodium chloride bolus         Signed:  Ricci Claudio MD    Part of this note may have been written by using a voice dictation software. The note has been proof read but may still contain some grammatical/other typographical errors.

## 2023-01-12 VITALS
RESPIRATION RATE: 18 BRPM | HEART RATE: 93 BPM | DIASTOLIC BLOOD PRESSURE: 76 MMHG | SYSTOLIC BLOOD PRESSURE: 100 MMHG | OXYGEN SATURATION: 91 % | TEMPERATURE: 98.8 F

## 2023-01-12 PROCEDURE — 2580000003 HC RX 258: Performed by: HOSPITALIST

## 2023-01-12 RX ORDER — CEFDINIR 300 MG/1
300 CAPSULE ORAL 2 TIMES DAILY
Qty: 6 CAPSULE | Refills: 0 | Status: SHIPPED | OUTPATIENT
Start: 2023-01-12 | End: 2023-01-15

## 2023-01-12 RX ADMIN — NYSTATIN: 100000 CREAM TOPICAL at 09:55

## 2023-01-12 RX ADMIN — Medication 10 ML: at 09:56

## 2023-01-12 NOTE — ED NOTES
Discharge instructions reviewed with daughter. Prescriptions given for antibiotic and med info sheets provided for all new medications. Opportunity for questions provided. daughter voiced understanding of all discharge instructions.    Iv removed

## 2023-01-12 NOTE — DISCHARGE SUMMARY
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 *       Discharge Medications:  Current Discharge Medication List        START taking these medications    Details   cefdinir (OMNICEF) 300 MG capsule Take 1 capsule by mouth 2 times daily for 3 days  Qty: 6 capsule, Refills: 0           CONTINUE these medications which have NOT CHANGED    Details   escitalopram (LEXAPRO) 10 MG tablet Take 10 mg by mouth daily      umeclidinium-vilanterol (ANORO ELLIPTA) 62.5-25 MCG/INH AEPB inhaler Inhale 1 puff into the lungs daily             Pending Labs:  None    Follow-up (including scheduled tests):  PMD    History of Present Illness:  81 y.o. female with medical history dementia, current with hospice and palliative care, who presents w/ daughter w/ c/o 15 to 20 seconds of altered mental state with no postictal period or evidence of tonic-clonic activity. Daughter states the patient had similar episode back in December when she was initially diagnosed with COVID-19. CT head at that time 12/26/22 negative for acute intracranial abnormality noted. Severe bilateral white matter hypoattenuation most in keeping with microangiopathic disease with moderate cerebral atrophy was noted. Work-up thus far tonight with evidence of leukocytosis white blood cell count of 19.3. Chest x-ray negative for infiltrate or consolidation. UA nitrite positive with small LE, 10-20 WBC. Patient demented at baseline but daughter states increased confusion. lactic acid 1.6. Urine culture added on. Rocephin 1 g IV ordered.      Past Medical History:  Past Medical History:   Diagnosis Date    Anxiety     COPD (chronic obstructive pulmonary disease) ([facility] [address]     Dementia ([facility] [address]     Depression Hyperlipidemia        Allergies:  No Known Allergies    Hospital Course:  82-year-old female with medical history of advanced dementia, currently with palliative care, now presented in setting of unresponsiveness and possible seizure     1. Acute metabolic encephalopathy  -CT of the brain without acute intracranial findings  -Improved symptomatology  -Mentation back to baseline    2. Urinary tract infection  -Started on IV ceftriaxone  -Urine culture positive for gram-negative rods  -Discharged on cefdinir    Procedures:  None    Discharge Day Information:  Follow with PMD      Discharge Physical Exam:  General: Alert, NAD  HEENT: NC/AT, EOM are intact  Neck: supple, no JVD  Cardiovascular: RRR, S1, S2, no murmurs  Respiratory: Lungs are clear, no wheezes or rales  Abdomen: Soft, NT, ND  Back: No CVA tenderness, no paraspinal tenderness  Extremities: LE without pedal edema, no erythema  Neuro: CN are intact, no focal deficits  Skin: no rash or ulcers    No results found for this or any previous visit (from the past 24 hour(s)). CT HEAD WO CONTRAST   Final Result   1. No acute intracranial abnormality. The left and chronic microvascular   ischemic change. 2. Right maxillary sinus and thickening possibly sinusitis in the correct   clinical setting. This represents a change from the prior head CT.          XR CHEST PORTABLE   Final Result           Condition: Improved    Disposition: Home    Consultants During This Hospitalization: None            Spent 31 minutes on discharge services

## 2023-01-13 LAB
BACTERIA SPEC CULT: ABNORMAL
SERVICE CMNT-IMP: ABNORMAL

## 2023-01-15 LAB
BACTERIA SPEC CULT: NORMAL
BACTERIA SPEC CULT: NORMAL
SERVICE CMNT-IMP: NORMAL
SERVICE CMNT-IMP: NORMAL

## 2024-04-16 NOTE — PROGRESS NOTES
Bianca Jeffrey  Admission Date: 8/6/2021             Daily Progress Note: 8/7/2021    The patient's chart is reviewed and the patient is discussed with the staff. Bianca Jeffrey is a 60-year-old female who has dementia who is admitted with overdose of cilostazol, suspected to be about 60 tablets. Subjective:   Off levophed since 3am.  Hgb stable. Hypokalemic and completed repletion. Current Facility-Administered Medications   Medication Dose Route Frequency    dextrose 5 % - 0.45% NaCl infusion  50 mL/hr IntraVENous CONTINUOUS    NOREPINephrine (LEVOPHED) 4 mg in 5% dextrose 250 mL infusion  0.5-30 mcg/min IntraVENous TITRATE    sodium chloride (NS) flush 5-40 mL  5-40 mL IntraVENous Q8H    sodium chloride (NS) flush 5-40 mL  5-40 mL IntraVENous PRN    acetaminophen (TYLENOL) tablet 650 mg  650 mg Oral Q6H PRN    Or    acetaminophen (TYLENOL) suppository 650 mg  650 mg Rectal Q6H PRN    polyethylene glycol (MIRALAX) packet 17 g  17 g Oral DAILY PRN    ondansetron (ZOFRAN ODT) tablet 4 mg  4 mg Oral Q8H PRN    Or    ondansetron (ZOFRAN) injection 4 mg  4 mg IntraVENous Q6H PRN       Review of Systems  Unobtainable due to patient status. Objective:     Vitals:    08/07/21 1001 08/07/21 1016 08/07/21 1032 08/07/21 1047   BP: 124/68 120/61 125/69 135/65   Pulse: (!) 116 (!) 118 (!) 121 (!) 116   Resp: 28 28 25 26   Temp:       SpO2: 97% 96% 97% 97%         Intake/Output Summary (Last 24 hours) at 8/7/2021 1132  Last data filed at 8/7/2021 0301  Gross per 24 hour   Intake --   Output 350 ml   Net -350 ml       Physical Exam:   Constitution:  the patient is well developed and in no acute distress  EENMT:  Sclera clear, pupils equal, oral mucosa moist  Respiratory: wheezes  Cardiovascular:  tachy without M,G,R  Gastrointestinal: soft and non-tender; with positive bowel sounds. Musculoskeletal: warm without cyanosis. There is no lower extremity edema.   Skin:  no jaundice or Labor Instructions (37 - 39 weeks):  Call your physician or return to hospital if:  You have regular contractions that get progressively closer, longer and stronger.  Your water breaks (remember time and color).  You have bleeding like a period.  Your baby does not move enough to complete the daily kick counts (10 movements in 2 hours)  Your baby moves much less often than on the days before or you have not felt your baby move all day.     rashes, no wounds   Neurologic: occasionally verbal    CXR:   tomorrow    LAB  No results for input(s): GLUCPOC in the last 72 hours. No lab exists for component: [place]   Recent Labs     08/07/21 0315 08/06/21  1911 08/06/21  1431   WBC 15.2*  --  10.3   HGB 8.8*  --  11.9   HCT 26.7*  --  37.4     --  399   INR  --  1.2  --      Recent Labs     08/07/21  0315 08/06/21  1431    142   K 3.4* 3.4*   * 107   CO2 22 32   * 122*   BUN 14 15   CREA 0.65 0.71   MG  --  1.9   CA 8.0* 9.2   ALB  --  3.2   TBILI  --  0.4   ALT  --  17     No results for input(s): PH, PCO2, PO2, HCO3, PHI, PCO2I, PO2I, HCO3I in the last 72 hours. No results for input(s): LCAD, LAC in the last 72 hours. Assessment:  (Medical Decision Making)     Hospital Problems  Date Reviewed: 11/16/2016        Codes Class Noted POA    Anemia ICD-10-CM: D64.9  ICD-9-CM: 285.9  8/7/2021 Unknown    No transfusion required    Hypoxia ICD-10-CM: R09.02  ICD-9-CM: 799.02  8/7/2021 Unknown    Suspect pulmonary edema    Overdose ICD-10-CM: T50.901A  ICD-9-CM: 977.9, E980.5  8/6/2021 Unknown    Cilostazol. 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 Now wheezing. Check CXR in am  --stable for transfer from ICU if H/H remains stable  --DNR    More than 50% of the time documented was spent in face-to-face contact with the patient and in the care of the patient on the floor/unit where the patient is located.     Ren Dozier MD